# Patient Record
Sex: FEMALE | Race: WHITE | HISPANIC OR LATINO
[De-identification: names, ages, dates, MRNs, and addresses within clinical notes are randomized per-mention and may not be internally consistent; named-entity substitution may affect disease eponyms.]

---

## 2018-01-24 PROBLEM — Z00.00 ENCOUNTER FOR PREVENTIVE HEALTH EXAMINATION: Status: ACTIVE | Noted: 2018-01-24

## 2018-02-06 ENCOUNTER — CHART COPY (OUTPATIENT)
Age: 33
End: 2018-02-06

## 2018-02-07 ENCOUNTER — APPOINTMENT (OUTPATIENT)
Dept: OBGYN | Facility: CLINIC | Age: 33
End: 2018-02-07

## 2018-02-07 ENCOUNTER — RECORD ABSTRACTING (OUTPATIENT)
Age: 33
End: 2018-02-07

## 2018-02-07 DIAGNOSIS — R87.629 UNSPECIFIED ABNORMAL CYTOLOGICAL FINDINGS IN SPECIMENS FROM VAGINA: ICD-10-CM

## 2018-02-07 DIAGNOSIS — Z87.42 PERSONAL HISTORY OF OTHER DISEASES OF THE FEMALE GENITAL TRACT: ICD-10-CM

## 2018-02-07 DIAGNOSIS — Z83.3 FAMILY HISTORY OF DIABETES MELLITUS: ICD-10-CM

## 2018-02-07 DIAGNOSIS — Z82.0 FAMILY HISTORY OF EPILEPSY AND OTHER DISEASES OF THE NERVOUS SYSTEM: ICD-10-CM

## 2018-02-07 DIAGNOSIS — Z87.891 PERSONAL HISTORY OF NICOTINE DEPENDENCE: ICD-10-CM

## 2018-02-07 DIAGNOSIS — B37.9 CANDIDIASIS, UNSPECIFIED: ICD-10-CM

## 2018-02-07 DIAGNOSIS — Z86.32 PERSONAL HISTORY OF GESTATIONAL DIABETES: ICD-10-CM

## 2018-02-08 ENCOUNTER — OUTPATIENT (OUTPATIENT)
Dept: OUTPATIENT SERVICES | Facility: HOSPITAL | Age: 33
LOS: 1 days | Discharge: HOME | End: 2018-02-08

## 2018-02-08 ENCOUNTER — APPOINTMENT (OUTPATIENT)
Dept: OBGYN | Facility: CLINIC | Age: 33
End: 2018-02-08
Payer: COMMERCIAL

## 2018-02-08 ENCOUNTER — LABORATORY RESULT (OUTPATIENT)
Age: 33
End: 2018-02-08

## 2018-02-08 VITALS — WEIGHT: 255 LBS | BODY MASS INDEX: 36.92 KG/M2 | HEIGHT: 69.5 IN

## 2018-02-08 LAB
BILIRUB UR QL STRIP: NORMAL
GLUCOSE UR-MCNC: >1000
HGB UR QL STRIP.AUTO: NORMAL
KETONES UR-MCNC: NORMAL
LEUKOCYTE ESTERASE UR QL STRIP: NORMAL
NITRITE UR QL STRIP: NORMAL
PH UR STRIP: 7
PROT UR STRIP-MCNC: NORMAL
SP GR UR STRIP: 1.01

## 2018-02-08 PROCEDURE — 76817 TRANSVAGINAL US OBSTETRIC: CPT

## 2018-02-08 PROCEDURE — 81025 URINE PREGNANCY TEST: CPT

## 2018-02-08 PROCEDURE — 81003 URINALYSIS AUTO W/O SCOPE: CPT | Mod: QW

## 2018-02-08 PROCEDURE — 99395 PREV VISIT EST AGE 18-39: CPT | Mod: 25

## 2018-02-09 ENCOUNTER — RX RENEWAL (OUTPATIENT)
Age: 33
End: 2018-02-09

## 2018-02-15 DIAGNOSIS — N91.2 AMENORRHEA, UNSPECIFIED: ICD-10-CM

## 2018-02-16 ENCOUNTER — CHART COPY (OUTPATIENT)
Age: 33
End: 2018-02-16

## 2018-02-22 ENCOUNTER — APPOINTMENT (OUTPATIENT)
Dept: OBGYN | Facility: CLINIC | Age: 33
End: 2018-02-22
Payer: COMMERCIAL

## 2018-02-22 PROCEDURE — 76830 TRANSVAGINAL US NON-OB: CPT

## 2018-02-22 PROCEDURE — 99213 OFFICE O/P EST LOW 20 MIN: CPT | Mod: 25

## 2019-09-30 ENCOUNTER — OTHER (OUTPATIENT)
Age: 34
End: 2019-09-30

## 2019-10-02 ENCOUNTER — OUTPATIENT (OUTPATIENT)
Dept: OUTPATIENT SERVICES | Facility: HOSPITAL | Age: 34
LOS: 1 days | Discharge: HOME | End: 2019-10-02

## 2019-10-02 ENCOUNTER — LABORATORY RESULT (OUTPATIENT)
Age: 34
End: 2019-10-02

## 2019-10-02 ENCOUNTER — APPOINTMENT (OUTPATIENT)
Dept: OBGYN | Facility: CLINIC | Age: 34
End: 2019-10-02
Payer: COMMERCIAL

## 2019-10-02 VITALS — BODY MASS INDEX: 37.47 KG/M2 | HEIGHT: 69 IN | WEIGHT: 253 LBS

## 2019-10-02 PROCEDURE — 76830 TRANSVAGINAL US NON-OB: CPT

## 2019-10-02 PROCEDURE — 81025 URINE PREGNANCY TEST: CPT

## 2019-10-02 PROCEDURE — 99395 PREV VISIT EST AGE 18-39: CPT | Mod: 25

## 2019-10-02 PROCEDURE — 81003 URINALYSIS AUTO W/O SCOPE: CPT | Mod: QW

## 2019-10-03 DIAGNOSIS — Z01.419 ENCOUNTER FOR GYNECOLOGICAL EXAMINATION (GENERAL) (ROUTINE) WITHOUT ABNORMAL FINDINGS: ICD-10-CM

## 2019-10-29 ENCOUNTER — APPOINTMENT (OUTPATIENT)
Dept: OBGYN | Facility: CLINIC | Age: 34
End: 2019-10-29
Payer: COMMERCIAL

## 2019-10-29 ENCOUNTER — NON-APPOINTMENT (OUTPATIENT)
Age: 34
End: 2019-10-29

## 2019-10-29 VITALS — WEIGHT: 257 LBS | HEIGHT: 69 IN | BODY MASS INDEX: 38.06 KG/M2

## 2019-10-29 PROCEDURE — 0502F SUBSEQUENT PRENATAL CARE: CPT

## 2019-11-01 LAB
BILIRUB UR QL STRIP: NORMAL
BILIRUB UR QL STRIP: NORMAL
CLARITY UR: CLEAR
CLARITY UR: CLEAR
GLUCOSE UR-MCNC: NORMAL
GLUCOSE UR-MCNC: NORMAL
HCG UR QL: NORMAL EU/DL
HCG UR QL: NORMAL EU/DL
HGB UR QL STRIP.AUTO: NORMAL
HGB UR QL STRIP.AUTO: NORMAL
KETONES UR-MCNC: NORMAL
KETONES UR-MCNC: NORMAL
LEUKOCYTE ESTERASE UR QL STRIP: 500
LEUKOCYTE ESTERASE UR QL STRIP: NORMAL
NITRITE UR QL STRIP: NORMAL
NITRITE UR QL STRIP: NORMAL
PH UR STRIP: 5
PH UR STRIP: 5
PROT UR STRIP-MCNC: NORMAL
PROT UR STRIP-MCNC: NORMAL
SP GR UR STRIP: 1.02
SP GR UR STRIP: 1.02

## 2019-11-18 ENCOUNTER — NON-APPOINTMENT (OUTPATIENT)
Age: 34
End: 2019-11-18

## 2019-11-21 ENCOUNTER — NON-APPOINTMENT (OUTPATIENT)
Age: 34
End: 2019-11-21

## 2019-11-27 ENCOUNTER — NON-APPOINTMENT (OUTPATIENT)
Age: 34
End: 2019-11-27

## 2019-11-27 ENCOUNTER — APPOINTMENT (OUTPATIENT)
Dept: OBGYN | Facility: CLINIC | Age: 34
End: 2019-11-27
Payer: COMMERCIAL

## 2019-11-27 VITALS — SYSTOLIC BLOOD PRESSURE: 140 MMHG | WEIGHT: 252 LBS | DIASTOLIC BLOOD PRESSURE: 80 MMHG | BODY MASS INDEX: 37.21 KG/M2

## 2019-11-27 LAB
GLUCOSE UR-MCNC: NORMAL
HGB UR QL STRIP.AUTO: NORMAL
KETONES UR-MCNC: NORMAL
LEUKOCYTE ESTERASE UR QL STRIP: NORMAL
NITRITE UR QL STRIP: NORMAL
PROT UR STRIP-MCNC: NORMAL

## 2019-11-27 PROCEDURE — 0502F SUBSEQUENT PRENATAL CARE: CPT

## 2019-12-19 ENCOUNTER — APPOINTMENT (OUTPATIENT)
Dept: OBGYN | Facility: CLINIC | Age: 34
End: 2019-12-19

## 2019-12-19 ENCOUNTER — NON-APPOINTMENT (OUTPATIENT)
Age: 34
End: 2019-12-19

## 2019-12-30 ENCOUNTER — APPOINTMENT (OUTPATIENT)
Dept: OBGYN | Facility: CLINIC | Age: 34
End: 2019-12-30
Payer: COMMERCIAL

## 2019-12-30 ENCOUNTER — NON-APPOINTMENT (OUTPATIENT)
Age: 34
End: 2019-12-30

## 2019-12-30 VITALS — WEIGHT: 259 LBS | BODY MASS INDEX: 38.36 KG/M2 | HEIGHT: 69 IN

## 2019-12-30 PROCEDURE — 0502F SUBSEQUENT PRENATAL CARE: CPT

## 2020-01-01 LAB
BILIRUB UR QL STRIP: NORMAL
CLARITY UR: CLEAR
GLUCOSE UR-MCNC: 50
HCG UR QL: NORMAL EU/DL
HGB UR QL STRIP.AUTO: NORMAL
KETONES UR-MCNC: NORMAL
LEUKOCYTE ESTERASE UR QL STRIP: NORMAL
NITRITE UR QL STRIP: NORMAL
PH UR STRIP: 6
PROT UR STRIP-MCNC: NORMAL
SP GR UR STRIP: 1.01

## 2020-01-15 ENCOUNTER — APPOINTMENT (OUTPATIENT)
Dept: OBGYN | Facility: CLINIC | Age: 35
End: 2020-01-15
Payer: COMMERCIAL

## 2020-01-15 ENCOUNTER — NON-APPOINTMENT (OUTPATIENT)
Age: 35
End: 2020-01-15

## 2020-01-15 VITALS — WEIGHT: 261 LBS | DIASTOLIC BLOOD PRESSURE: 80 MMHG | BODY MASS INDEX: 38.54 KG/M2 | SYSTOLIC BLOOD PRESSURE: 120 MMHG

## 2020-01-15 PROCEDURE — 0502F SUBSEQUENT PRENATAL CARE: CPT

## 2020-01-27 ENCOUNTER — NON-APPOINTMENT (OUTPATIENT)
Age: 35
End: 2020-01-27

## 2020-01-31 ENCOUNTER — OUTPATIENT (OUTPATIENT)
Dept: OUTPATIENT SERVICES | Facility: HOSPITAL | Age: 35
LOS: 1 days | Discharge: HOME | End: 2020-01-31

## 2020-01-31 ENCOUNTER — APPOINTMENT (OUTPATIENT)
Dept: ANTEPARTUM | Facility: CLINIC | Age: 35
End: 2020-01-31
Payer: COMMERCIAL

## 2020-01-31 ENCOUNTER — RESULT CHARGE (OUTPATIENT)
Age: 35
End: 2020-01-31

## 2020-01-31 VITALS
WEIGHT: 262 LBS | SYSTOLIC BLOOD PRESSURE: 124 MMHG | BODY MASS INDEX: 38.69 KG/M2 | OXYGEN SATURATION: 97 % | HEART RATE: 86 BPM | TEMPERATURE: 97.7 F | DIASTOLIC BLOOD PRESSURE: 86 MMHG

## 2020-01-31 DIAGNOSIS — Z34.90 ENCOUNTER FOR SUPERVISION OF NORMAL PREGNANCY, UNSPECIFIED, UNSPECIFIED TRIMESTER: ICD-10-CM

## 2020-01-31 LAB
BILIRUB UR QL STRIP: NORMAL
CLARITY UR: CLEAR
COLLECTION METHOD: NORMAL
FETAL HEART DESCRIPTION: NORMAL
FETAL HEART RATE (BPM): 147
FETAL MOVEMENT: PRESENT
GLUCOSE BLDC GLUCOMTR-MCNC: 146
GLUCOSE BLDC GLUCOMTR-MCNC: 146 MG/DL — HIGH (ref 70–99)
GLUCOSE UR-MCNC: NORMAL
HCG UR QL: 0.2 EU/DL
HGB UR QL STRIP.AUTO: NORMAL
KETONES UR-MCNC: NORMAL
LEUKOCYTE ESTERASE UR QL STRIP: NORMAL
NITRITE UR QL STRIP: NORMAL
OB COMMENTS: NORMAL
PH UR STRIP: 5
PROT UR STRIP-MCNC: 3
SCHEDULED VISIT: YES
SP GR UR STRIP: 1.01
URINE ALBUMIN/PROTEIN: 3
URINE GLUCOSE: NORMAL
URINE KETONES: NORMAL
WEEKS GESTATION: 25

## 2020-01-31 PROCEDURE — 99215 OFFICE O/P EST HI 40 MIN: CPT

## 2020-01-31 RX ORDER — METHYLDOPA 250 MG/1
250 TABLET, FILM COATED ORAL TWICE DAILY
Qty: 90 | Refills: 2 | Status: DISCONTINUED | COMMUNITY
Start: 2018-02-08 | End: 2020-01-31

## 2020-01-31 RX ORDER — INSULIN LISPRO 100 [IU]/ML
100 INJECTION, SOLUTION INTRAVENOUS; SUBCUTANEOUS
Qty: 5 | Refills: 0 | Status: ACTIVE | COMMUNITY
Start: 2020-01-31 | End: 1900-01-01

## 2020-01-31 RX ORDER — ATENOLOL 50 MG/1
50 TABLET ORAL
Refills: 0 | Status: DISCONTINUED | COMMUNITY
End: 2020-01-31

## 2020-01-31 RX ORDER — NITROFURANTOIN (MONOHYDRATE/MACROCRYSTALS) 25; 75 MG/1; MG/1
100 CAPSULE ORAL TWICE DAILY
Qty: 14 | Refills: 0 | Status: DISCONTINUED | COMMUNITY
Start: 2018-02-09 | End: 2020-01-31

## 2020-01-31 RX ORDER — PROGESTERONE 200 MG/1
200 CAPSULE ORAL
Qty: 30 | Refills: 2 | Status: DISCONTINUED | COMMUNITY
Start: 2019-10-29 | End: 2020-01-31

## 2020-01-31 RX ORDER — INSULIN LISPRO 100 [IU]/ML
100 INJECTION, SOLUTION INTRAVENOUS; SUBCUTANEOUS
Qty: 5 | Refills: 0 | Status: ACTIVE | COMMUNITY
Start: 2020-01-31

## 2020-01-31 NOTE — DISCUSSION/SUMMARY
[FreeTextEntry1] : Fasting blood sugar 146 today. \par Discussed optimal diabetes control in pregnancy. Advised patient to contact her endocrinologist weekly with her blood sugar logs because she needs more insulin.

## 2020-01-31 NOTE — OB HISTORY
[Pregnancy History] : boy [___] : pregnancy complications occured [LMP: ___] : LMP: [unfilled] [SAGE: ___] : SAGE: [unfilled] [EGA: ___ wks] : EGA: [unfilled] wks [Spontaneous] : Spontaneous conception [Sonogram] : sonogram [at ___ wks] : at [unfilled] weeks [de-identified] : early ultrasound [FreeTextEntry1] : Stopped her diabetic medications when she found out she was pregnant. A1c was 9 at the time. Started on Levimir and humalog by an endocrinologist in [place]. Her A1c is down to 6.2. FBS range 90 - 140's, Post prandials are 120 - 200. She adds extra short acting insulin when > 160. BP has been good on labetalol 100 mg twice a day. Had a subchorionic collection at 12 weeks that has resolved.

## 2020-01-31 NOTE — ACTIVE PROBLEMS
[Heart Disease] : no heart disease [Autoimmune Disease] : no autoimmune disease [Renal Disease] : no kidney disease, no UTI [Neurologic Disorder] : no neurologic disorder, no epilepsy [Psychiatric Disorders] : no psychiatric disorders [Depression] : no depression, no post partum depression [Hepatic Disorder] : no hepatitis, no liver disease [Thrombophlebitis] : no varicosities, no phlebitis [Thyroid Disorder] : no thyroid dysfunction [Trauma] : no trauma/violence [Blood Transfusion (___ Ml)] : no history of blood transfusion

## 2020-01-31 NOTE — CHIEF COMPLAINT
[G ___] : G [unfilled] [P ___] : P [unfilled] [Chronic Hypertension] : Chronic Hypertension [Pre-existing Diabetes] : Pre-existing Diabetes [H/O  Delivery] : history of  delivery

## 2020-02-03 DIAGNOSIS — E66.9 OBESITY, UNSPECIFIED: ICD-10-CM

## 2020-02-03 DIAGNOSIS — O09.90 SUPERVISION OF HIGH RISK PREGNANCY, UNSPECIFIED, UNSPECIFIED TRIMESTER: ICD-10-CM

## 2020-02-03 DIAGNOSIS — I10 ESSENTIAL (PRIMARY) HYPERTENSION: ICD-10-CM

## 2020-02-03 DIAGNOSIS — E11.9 TYPE 2 DIABETES MELLITUS WITHOUT COMPLICATIONS: ICD-10-CM

## 2020-02-03 DIAGNOSIS — Z3A.25 25 WEEKS GESTATION OF PREGNANCY: ICD-10-CM

## 2020-02-07 ENCOUNTER — NON-APPOINTMENT (OUTPATIENT)
Age: 35
End: 2020-02-07

## 2020-02-07 ENCOUNTER — APPOINTMENT (OUTPATIENT)
Dept: OBGYN | Facility: CLINIC | Age: 35
End: 2020-02-07
Payer: COMMERCIAL

## 2020-02-07 VITALS — WEIGHT: 258 LBS | BODY MASS INDEX: 38.1 KG/M2 | SYSTOLIC BLOOD PRESSURE: 110 MMHG | DIASTOLIC BLOOD PRESSURE: 80 MMHG

## 2020-02-07 PROCEDURE — 0502F SUBSEQUENT PRENATAL CARE: CPT

## 2020-02-21 ENCOUNTER — APPOINTMENT (OUTPATIENT)
Dept: ANTEPARTUM | Facility: CLINIC | Age: 35
End: 2020-02-21
Payer: COMMERCIAL

## 2020-02-21 ENCOUNTER — OUTPATIENT (OUTPATIENT)
Dept: OUTPATIENT SERVICES | Facility: HOSPITAL | Age: 35
LOS: 1 days | Discharge: HOME | End: 2020-02-21

## 2020-02-21 ENCOUNTER — ASOB RESULT (OUTPATIENT)
Age: 35
End: 2020-02-21

## 2020-02-21 VITALS
BODY MASS INDEX: 38.69 KG/M2 | WEIGHT: 262 LBS | SYSTOLIC BLOOD PRESSURE: 124 MMHG | OXYGEN SATURATION: 99 % | TEMPERATURE: 97.9 F | HEART RATE: 85 BPM | DIASTOLIC BLOOD PRESSURE: 72 MMHG

## 2020-02-21 LAB
BILIRUB UR QL STRIP: NEGATIVE
CLARITY UR: CLEAR
COLLECTION METHOD: NORMAL
FETAL HEART DESCRIPTION: NORMAL
FETAL HEART RATE (BPM): 140
FETAL MOVEMENT: PRESENT
GLUCOSE BLDC GLUCOMTR-MCNC: 112
GLUCOSE UR-MCNC: NEGATIVE
HCG UR QL: 0.2 EU/DL
HGB UR QL STRIP.AUTO: NEGATIVE
KETONES UR-MCNC: NEGATIVE
LEUKOCYTE ESTERASE UR QL STRIP: NEGATIVE
NITRITE UR QL STRIP: NEGATIVE
OB COMMENTS: NORMAL
PH UR STRIP: 6.5
PROT UR STRIP-MCNC: NORMAL
SCHEDULED VISIT: YES
SP GR UR STRIP: 1.02
URINE ALBUMIN/PROTEIN: NORMAL
URINE GLUCOSE: NEGATIVE
URINE KETONES: NEGATIVE
WEEKS GESTATION: 28.1

## 2020-02-21 PROCEDURE — 76816 OB US FOLLOW-UP PER FETUS: CPT | Mod: 26

## 2020-02-21 PROCEDURE — 99214 OFFICE O/P EST MOD 30 MIN: CPT | Mod: 25

## 2020-02-21 PROCEDURE — 76819 FETAL BIOPHYS PROFIL W/O NST: CPT | Mod: 26

## 2020-02-21 RX ORDER — INSULIN DETEMIR 100 [IU]/ML
100 INJECTION, SOLUTION SUBCUTANEOUS
Qty: 1 | Refills: 0 | Status: ACTIVE | COMMUNITY
Start: 2020-01-31

## 2020-02-21 RX ORDER — LABETALOL HYDROCHLORIDE 200 MG/1
200 TABLET, FILM COATED ORAL
Refills: 0 | Status: ACTIVE | COMMUNITY
Start: 2020-01-31

## 2020-02-21 NOTE — ACTIVE PROBLEMS
[Heart Disease] : no heart disease [Autoimmune Disease] : no autoimmune disease [Renal Disease] : no kidney disease, no UTI [Psychiatric Disorders] : no psychiatric disorders [Neurologic Disorder] : no neurologic disorder, no epilepsy [Depression] : no depression, no post partum depression [Hepatic Disorder] : no hepatitis, no liver disease [Thrombophlebitis] : no varicosities, no phlebitis [Thyroid Disorder] : no thyroid dysfunction [Trauma] : no trauma/violence [Blood Transfusion (___ Ml)] : no history of blood transfusion

## 2020-02-21 NOTE — OB HISTORY
[Pregnancy History] : boy [___] : Delivery occurred at [unfilled] [LMP: ___] : LMP: [unfilled] [SAGE: ___] : SAGE: [unfilled] [EGA: ___ wks] : EGA: [unfilled] wks [Spontaneous] : Spontaneous conception [Sonogram] : sonogram [at ___ wks] : at [unfilled] weeks [FreeTextEntry1] : Stopped her diabetic medications when she found out she was pregnant. A1c was 9 at the time. Started on Levimir and humalog by an endocrinologist in [place]. Her A1c is down to 6.2. FBS range 90 - 140's, Post prandials are 120 - 200. She adds extra short acting insulin when > 160. BP has been good on labetalol 100 mg twice a day. Had a subchorionic collection at 12 weeks that has resolved. [de-identified] : early ultrasound

## 2020-02-21 NOTE — END OF VISIT
[] : Resident [FreeTextEntry3] : Mother and fetus doing well. Glucose control is less than ideal. Fetus is not too large. Discussed need for more insulin. Asked her to begin recording total amount of insulin she is using daily. Has endocrine appointment next week.

## 2020-02-21 NOTE — CHIEF COMPLAINT
[P ___] : P [unfilled] [G ___] : G [unfilled] [Chronic Hypertension] : Chronic Hypertension [Pre-existing Diabetes] : Pre-existing Diabetes [H/O  Delivery] : history of  delivery

## 2020-02-21 NOTE — DISCUSSION/SUMMARY
[FreeTextEntry1] : 35 yo  at 28w1d, co-managed with [facility] for T2DM, CHTN and hx of  delivery. Good FM, no LOF, VB or contractions.\par Denies headaches, vision changes, CP, SOB, RUQ/epigastric pain  or increased swelling.\par \par #T2DM on glucose monitor\par -FS today 112 (has not eaten in since lunch yesterday)\par -REGIMEN: Levemir 20u in AM, 8u at night, Humalog 8-20u prior to meals (does not carb count, just on average boluses herself how much she things), she then checks her FS 1 hr PP, if >140, gives herself 1u per every 40u above 140. Discussed need for increased insulin. Pt to record total amount of insulin given a day, and increased averaged insuling per meal to 10-24. Should discuss this with her endocrinologist first. \par -FS log: fasting , postprandial , 2/3s of postprandial values elevated, all fasting values elevated. \par Discussed FS goals Fasting <95, PP<120\par -Had 4 hypoglycemia episodes bw 40-60 in early AM, never symptomatic\par -Hypoglycemia precautions given\par -Has endocrinology appointment next week, will check HgA1c at that time and adjust regimen.\par -Completed fetal echo, normal per pt (Dr. Daniel).\par -Risks of uncontrolled T2DM previously discussed. \par \par #CHTN\par -BP today 124/72\par -Has not taken BP logs at home recently, reports usually 110s/80s\par -On Labetalol 200mg BID\par -Risks of CHTN in pregnancy discussed previously\par -Recommended BP log\par -Will go for preeclamptic labs and 24hr collection next week.\par \par #Hx  delivery\par -Could not tolerate oral progesterone in early pregnancy which was prescribed by another provider. Declined [place].\par - labor precautions given\par \par #Pregnancy\par -Continue care with Agustin Ellington/Wade, next appointment \par -Weekly BPP and PNOV\par -c/w aspirin\par PTL precautions and FKC discussed,encouraged PO hydration.

## 2020-02-23 ENCOUNTER — NON-APPOINTMENT (OUTPATIENT)
Age: 35
End: 2020-02-23

## 2020-02-25 DIAGNOSIS — E66.9 OBESITY, UNSPECIFIED: ICD-10-CM

## 2020-02-25 DIAGNOSIS — E11.9 TYPE 2 DIABETES MELLITUS WITHOUT COMPLICATIONS: ICD-10-CM

## 2020-02-25 DIAGNOSIS — Z3A.28 28 WEEKS GESTATION OF PREGNANCY: ICD-10-CM

## 2020-02-25 DIAGNOSIS — O09.529 SUPERVISION OF ELDERLY MULTIGRAVIDA, UNSPECIFIED TRIMESTER: ICD-10-CM

## 2020-02-25 DIAGNOSIS — O09.90 SUPERVISION OF HIGH RISK PREGNANCY, UNSPECIFIED, UNSPECIFIED TRIMESTER: ICD-10-CM

## 2020-02-25 DIAGNOSIS — I10 ESSENTIAL (PRIMARY) HYPERTENSION: ICD-10-CM

## 2020-02-28 ENCOUNTER — RESULT CHARGE (OUTPATIENT)
Age: 35
End: 2020-02-28

## 2020-02-28 ENCOUNTER — APPOINTMENT (OUTPATIENT)
Dept: ANTEPARTUM | Facility: CLINIC | Age: 35
End: 2020-02-28
Payer: COMMERCIAL

## 2020-02-28 ENCOUNTER — OUTPATIENT (OUTPATIENT)
Dept: OUTPATIENT SERVICES | Facility: HOSPITAL | Age: 35
LOS: 1 days | Discharge: HOME | End: 2020-02-28

## 2020-02-28 ENCOUNTER — ASOB RESULT (OUTPATIENT)
Age: 35
End: 2020-02-28

## 2020-02-28 ENCOUNTER — NON-APPOINTMENT (OUTPATIENT)
Age: 35
End: 2020-02-28

## 2020-02-28 VITALS
TEMPERATURE: 97.9 F | SYSTOLIC BLOOD PRESSURE: 133 MMHG | OXYGEN SATURATION: 98 % | BODY MASS INDEX: 38.84 KG/M2 | DIASTOLIC BLOOD PRESSURE: 72 MMHG | HEART RATE: 90 BPM | WEIGHT: 263 LBS

## 2020-02-28 LAB
BILIRUB UR QL STRIP: NORMAL
CLARITY UR: CLEAR
COLLECTION METHOD: NORMAL
FETAL HEART DESCRIPTION: NORMAL
FETAL HEART RATE (BPM): NORMAL
FETAL MOVEMENT: PRESENT
GLUCOSE UR-MCNC: NORMAL
HCG UR QL: 0.2 EU/DL
HGB UR QL STRIP.AUTO: NORMAL
KETONES UR-MCNC: NORMAL
LEUKOCYTE ESTERASE UR QL STRIP: NORMAL
NITRITE UR QL STRIP: NORMAL
OB COMMENTS: NORMAL
PH UR STRIP: 6
PROT UR STRIP-MCNC: NORMAL
SCHEDULED VISIT: YES
SP GR UR STRIP: 1.02
URINE ALBUMIN/PROTEIN: NORMAL
URINE GLUCOSE: NORMAL
URINE KETONES: NORMAL
WEEKS GESTATION: 29.1

## 2020-02-28 PROCEDURE — 76819 FETAL BIOPHYS PROFIL W/O NST: CPT | Mod: 26

## 2020-02-28 PROCEDURE — 99214 OFFICE O/P EST MOD 30 MIN: CPT | Mod: 25

## 2020-03-04 DIAGNOSIS — O10.919 UNSPECIFIED PRE-EXISTING HYPERTENSION COMPLICATING PREGNANCY, UNSPECIFIED TRIMESTER: ICD-10-CM

## 2020-03-04 DIAGNOSIS — O24.313 UNSPECIFIED PRE-EXISTING DIABETES MELLITUS IN PREGNANCY, THIRD TRIMESTER: ICD-10-CM

## 2020-03-04 DIAGNOSIS — Z3A.29 29 WEEKS GESTATION OF PREGNANCY: ICD-10-CM

## 2020-03-04 DIAGNOSIS — O09.523 SUPERVISION OF ELDERLY MULTIGRAVIDA, THIRD TRIMESTER: ICD-10-CM

## 2020-03-04 DIAGNOSIS — O99.213 OBESITY COMPLICATING PREGNANCY, THIRD TRIMESTER: ICD-10-CM

## 2020-03-05 ENCOUNTER — APPOINTMENT (OUTPATIENT)
Dept: OBGYN | Facility: CLINIC | Age: 35
End: 2020-03-05
Payer: COMMERCIAL

## 2020-03-05 ENCOUNTER — NON-APPOINTMENT (OUTPATIENT)
Age: 35
End: 2020-03-05

## 2020-03-05 VITALS — BODY MASS INDEX: 37.51 KG/M2 | HEIGHT: 70 IN | WEIGHT: 262 LBS

## 2020-03-05 LAB
BILIRUB UR QL STRIP: 1
CLARITY UR: CLEAR
GLUCOSE UR-MCNC: NORMAL
HCG UR QL: 1 EU/DL
HGB UR QL STRIP.AUTO: NORMAL
KETONES UR-MCNC: 50
LEUKOCYTE ESTERASE UR QL STRIP: 25
NITRITE UR QL STRIP: NORMAL
PH UR STRIP: 6
PROT UR STRIP-MCNC: NORMAL
SP GR UR STRIP: 1.02

## 2020-03-05 PROCEDURE — 0502F SUBSEQUENT PRENATAL CARE: CPT

## 2020-03-06 ENCOUNTER — APPOINTMENT (OUTPATIENT)
Dept: ANTEPARTUM | Facility: CLINIC | Age: 35
End: 2020-03-06
Payer: COMMERCIAL

## 2020-03-06 ENCOUNTER — RESULT CHARGE (OUTPATIENT)
Age: 35
End: 2020-03-06

## 2020-03-06 ENCOUNTER — OUTPATIENT (OUTPATIENT)
Dept: OUTPATIENT SERVICES | Facility: HOSPITAL | Age: 35
LOS: 1 days | Discharge: HOME | End: 2020-03-06

## 2020-03-06 ENCOUNTER — ASOB RESULT (OUTPATIENT)
Age: 35
End: 2020-03-06

## 2020-03-06 VITALS
BODY MASS INDEX: 37.88 KG/M2 | DIASTOLIC BLOOD PRESSURE: 74 MMHG | TEMPERATURE: 97.7 F | OXYGEN SATURATION: 98 % | WEIGHT: 264 LBS | HEART RATE: 91 BPM | SYSTOLIC BLOOD PRESSURE: 123 MMHG

## 2020-03-06 DIAGNOSIS — J06.9 ACUTE UPPER RESPIRATORY INFECTION, UNSPECIFIED: ICD-10-CM

## 2020-03-06 LAB
BILIRUB UR QL STRIP: NEGATIVE
CLARITY UR: CLEAR
COLLECTION METHOD: NORMAL
FETAL HEART DESCRIPTION: NORMAL
FETAL HEART RATE (BPM): 150
FETAL MOVEMENT: PRESENT
GLUCOSE BLDC GLUCOMTR-MCNC: 143
GLUCOSE BLDC GLUCOMTR-MCNC: 143 MG/DL — HIGH (ref 70–99)
GLUCOSE UR-MCNC: NEGATIVE
HCG UR QL: 0.2 EU/DL
HGB UR QL STRIP.AUTO: NEGATIVE
KETONES UR-MCNC: NORMAL
LEUKOCYTE ESTERASE UR QL STRIP: NEGATIVE
NITRITE UR QL STRIP: NEGATIVE
OB COMMENTS: NORMAL
PH UR STRIP: 6.5
PROT UR STRIP-MCNC: NEGATIVE
SCHEDULED VISIT: YES
SP GR UR STRIP: 1.02
URINE ALBUMIN/PROTEIN: NEGATIVE
URINE GLUCOSE: NEGATIVE
URINE KETONES: NORMAL
WEEKS GESTATION: 30.1

## 2020-03-06 PROCEDURE — 99214 OFFICE O/P EST MOD 30 MIN: CPT | Mod: 25

## 2020-03-06 PROCEDURE — 76819 FETAL BIOPHYS PROFIL W/O NST: CPT | Mod: 26

## 2020-03-06 NOTE — DISCUSSION/SUMMARY
[FreeTextEntry1] : 33 yo  at 30w1d, co-managed with [facility] for T2DM, CHTN and hx of  delivery. Good FM, no LOF, VB or contractions.\par Denies headaches, vision changes, CP, SOB, RUQ/epigastric pain  or increased swelling. Has an upper respiratory infection for the last week. Seen in urgent care. Flu negative. No fever. Continues to feel weak with a non productive cough.\par \par #T2DM on glucose monitor\par -FS today 143 (fasting)\par -REGIMEN: Levemir 25u in AM, 8u at night, Humalog 10-20u prior to meals (does not carb count, just on average boluses herself how much she things), she then checks her FS 1 hr PP, if >140, gives herself 1u per every 40u above 140. \par -FS log: fasting , postprandial , overall, all fasting values elevated and postprandial values improved. Also reports only 1 hypoglycemic values in the last week, asymptomatic. \par Discussed FS goals Fasting <95, PP<120\par -Hypoglycemia precautions given\par -Has endocrinology appointment next week, will check HgA1c at that time and adjust regimen.\par -Completed fetal echo, normal per pt (Dr. Daniel).\par -Risks of uncontrolled T2DM previously discussed. \par \par #CHTN\par -BP today 123/74\par -Still has not taken BP logs at home recently, reports usually 110s/80s. Again counseled on taking  BPd at home\par -Preeclmaptic precautions given\par -On Labetalol 200mg BID\par -Risks of CHTN in pregnancy discussed previously\par -Recommended BP log\par -Recommend  preeclamptic labs and 24hr collection (pt has not done this yet)\par \par #Hx  delivery\par -Could not tolerate oral progesterone in early pregnancy which was prescribed by another provider. Declined [place].\par - labor precautions given\par \par #Pregnancy\par -Continue care with Agustin Ellington/Wade, next appointment 3/2\par -Weekly BPP and PNOV\par -c/w aspirin\par PTL precautions and FKC discussed,encouraged PO hydration. Preeclamptic precautions given.\par RTC in 1 week

## 2020-03-06 NOTE — END OF VISIT
[] : Resident [FreeTextEntry3] : Blood sugar fair control. Will not change insulin at present due to URI.

## 2020-03-06 NOTE — PHYSICAL EXAM
[FreeTextEntry1] : General: In no apparent distress.\par HEENT:  Atraumatic.  Extraocular muscles intact.  Most mucous membranes.\par Neck:  No goiter.  No lymphadenopathy.\par Cardiovascular: Regular rate and rhythm, normal S1/S2\par Pulmonary: Clear to auscultation bilaterally.  No wheezing.\par Abdomen: soft, gravid, nontender, nondistended, no rebound, no guarding\par Extremities: no calf tenderness or edema\par Neurology: +2 reflexes in bilateral upper extremities\par Psychiatry:  Happy mood.  Awake , alert, oriented to person, place, time.\par

## 2020-03-09 ENCOUNTER — NON-APPOINTMENT (OUTPATIENT)
Age: 35
End: 2020-03-09

## 2020-03-09 DIAGNOSIS — Z3A.30 30 WEEKS GESTATION OF PREGNANCY: ICD-10-CM

## 2020-03-09 DIAGNOSIS — J06.9 ACUTE UPPER RESPIRATORY INFECTION, UNSPECIFIED: ICD-10-CM

## 2020-03-09 DIAGNOSIS — E66.9 OBESITY, UNSPECIFIED: ICD-10-CM

## 2020-03-09 DIAGNOSIS — I10 ESSENTIAL (PRIMARY) HYPERTENSION: ICD-10-CM

## 2020-03-09 DIAGNOSIS — E11.9 TYPE 2 DIABETES MELLITUS WITHOUT COMPLICATIONS: ICD-10-CM

## 2020-03-09 DIAGNOSIS — O09.90 SUPERVISION OF HIGH RISK PREGNANCY, UNSPECIFIED, UNSPECIFIED TRIMESTER: ICD-10-CM

## 2020-03-09 DIAGNOSIS — O09.529 SUPERVISION OF ELDERLY MULTIGRAVIDA, UNSPECIFIED TRIMESTER: ICD-10-CM

## 2020-03-13 ENCOUNTER — APPOINTMENT (OUTPATIENT)
Dept: ANTEPARTUM | Facility: CLINIC | Age: 35
End: 2020-03-13

## 2020-03-17 ENCOUNTER — APPOINTMENT (OUTPATIENT)
Dept: OBGYN | Facility: CLINIC | Age: 35
End: 2020-03-17
Payer: COMMERCIAL

## 2020-03-17 ENCOUNTER — NON-APPOINTMENT (OUTPATIENT)
Age: 35
End: 2020-03-17

## 2020-03-17 VITALS — BODY MASS INDEX: 37.02 KG/M2 | SYSTOLIC BLOOD PRESSURE: 130 MMHG | WEIGHT: 258 LBS | DIASTOLIC BLOOD PRESSURE: 80 MMHG

## 2020-03-17 PROCEDURE — 0502F SUBSEQUENT PRENATAL CARE: CPT

## 2020-03-20 ENCOUNTER — APPOINTMENT (OUTPATIENT)
Dept: ANTEPARTUM | Facility: CLINIC | Age: 35
End: 2020-03-20
Payer: COMMERCIAL

## 2020-03-20 ENCOUNTER — ASOB RESULT (OUTPATIENT)
Age: 35
End: 2020-03-20

## 2020-03-20 ENCOUNTER — OUTPATIENT (OUTPATIENT)
Dept: OUTPATIENT SERVICES | Facility: HOSPITAL | Age: 35
LOS: 1 days | Discharge: HOME | End: 2020-03-20

## 2020-03-20 ENCOUNTER — RESULT CHARGE (OUTPATIENT)
Age: 35
End: 2020-03-20

## 2020-03-20 VITALS
TEMPERATURE: 97.6 F | WEIGHT: 261 LBS | BODY MASS INDEX: 37.45 KG/M2 | HEART RATE: 86 BPM | DIASTOLIC BLOOD PRESSURE: 71 MMHG | SYSTOLIC BLOOD PRESSURE: 132 MMHG | OXYGEN SATURATION: 97 %

## 2020-03-20 LAB
BILIRUB UR QL STRIP: NORMAL
CLARITY UR: NORMAL
COLLECTION METHOD: NORMAL
FETAL HEART DESCRIPTION: NORMAL
FETAL HEART RATE (BPM): NORMAL
FETAL MOVEMENT: PRESENT
GLUCOSE BLDC GLUCOMTR-MCNC: 144
GLUCOSE BLDC GLUCOMTR-MCNC: 144 MG/DL — HIGH (ref 70–99)
GLUCOSE UR-MCNC: NORMAL
HCG UR QL: 0.2 EU/DL
HGB UR QL STRIP.AUTO: NORMAL
KETONES UR-MCNC: NORMAL
LEUKOCYTE ESTERASE UR QL STRIP: NORMAL
NITRITE UR QL STRIP: NORMAL
OB COMMENTS: NORMAL
PH UR STRIP: 5
PROT UR STRIP-MCNC: 1
SCHEDULED VISIT: YES
SP GR UR STRIP: 1.02
URINE ALBUMIN/PROTEIN: NORMAL
URINE GLUCOSE: NORMAL
URINE KETONES: NORMAL
WEEKS GESTATION: 32.1

## 2020-03-20 PROCEDURE — 99214 OFFICE O/P EST MOD 30 MIN: CPT | Mod: 25

## 2020-03-20 PROCEDURE — 76816 OB US FOLLOW-UP PER FETUS: CPT | Mod: 26

## 2020-03-20 PROCEDURE — 76818 FETAL BIOPHYS PROFILE W/NST: CPT | Mod: 26

## 2020-03-20 NOTE — DISCUSSION/SUMMARY
[FreeTextEntry1] : 35 yo  at 32w1d, co-managed with [facility] for T2DM, CHTN and hx of  delivery. Good FM, no LOF, VB or contractions.\par Denies headaches, vision changes, CP, SOB, RUQ/epigastric pain  or increased swelling. Resolved URI from 2 weeks ago, reports residual mild cough, but denies fevers, chills, or other URI sx. Stressed from current situation, not living at home due to renovations and could not live with family due to illness, now living in [employer] this week , has really not been able to focuse on meals and FS, and feels she may have forgotten to take insulin some days.\par \par #T2DM on glucose monitor\par -FS today 144 \par -REGIMEN: Levemir 28u in AM, 10u at night, Humalog 10-22u prior to meals (does not carb count, just on average boluses herself how much she things), she then checks her FS 1 hr PP, if >140, gives herself 1u per every 40u above 140. \par -Follows with endocrinologist, has next appt next week.\par -FS log: fasting , postprandial , overall fasting values worse than before but pt says she has not been focusing on meals or taking insulin consistently. Will increased Night time levemir to 12.\par Discussed FS goals Fasting <95, PP<120\par -Hypoglycemia precautions given\par -Has requisition for repeat HgA1C, has not gone yet\par -Completed fetal echo, normal per pt (Dr. Daniel).\par -Risks of uncontrolled T2DM previously discussed. \par \par #CHTN\par -BP today 123/74\par -Still has not taken BP logs at home recently, reports usually 110s/80s. Again counseled on taking  BPs at home\par -Preeclamptic precautions given\par -On Labetalol 200mg BID\par -Risks of CHTN in pregnancy discussed previously\par -Recommended BP log\par -Recommend  preeclamptic labs and 24hr collection (pt has not done this yet - afraid to go to labs) - will attempt to go  this week. Discussed importance of labwork.\par \par #Hx  delivery\par -Could not tolerate oral progesterone in early pregnancy which was prescribed by another provider. Declined [place].\par - labor precautions given\par \par #Pregnancy\par -Discussed general precautions including hand washing, avoiding touching face and avoiding sick people. If mild symptoms exist, encouraged to self isolate. If severe, present to the hospital,\par -Continue care with Agustin Ellington/Wade, next appointment next week\par -Will have weekly NSTs in their office, BPPs to be done in [facility]\par -Weekly BPP and PNOV\par -c/w aspirin\par PTL precautions and FKC discussed,encouraged PO hydration. Preeclamptic precautions given.\par RTC in 1 week

## 2020-03-20 NOTE — PHYSICAL EXAM
[FreeTextEntry1] : General: In no apparent distress.\par HEENT:  Atraumatic.  Extraocular muscles intact.  \par Cardiovascular: Regular rate and rhythm, normal S1/S2\par Pulmonary: Clear to auscultation bilaterally.  No wheezing.\par Abdomen: soft, gravid, nontender, nondistended, no rebound, no guarding\par Extremities: no calf tenderness or edema\par Neurology: +2 reflexes in bilateral upper extremities\par Psychiatry:  Anxious mood.  Awake , alert, oriented to person, place, time.\par

## 2020-03-20 NOTE — END OF VISIT
[FreeTextEntry3] : \par I saw and evaluated Ms. LEE with Dr. Valles and I agree with the documentation above.   I modified the note above (if indicated) and agree with its contents in the present form.\par \par DM2, on insulin.  Fasting fingersticks elevated. Postprandial values vary from 98- 170. She is not always taking her insulin.  This past week she moved to an air bed and breakfast to stay away from family members who work in the hospital, so she was only eating cereal many days. I will increase nighttime Levemir to 12 units.  Please followup with Endocrinologist.\par \par She has chronic hypertension and does not have a blood pressure log.  Blood pressure today was 132/71.\par \par She is very concerned about the Coronavirus and wants to stay away from the hospital as much as possible.\par \par Biophysical profile today was 8/10.  Nonreactive non stress test with moderate variability and no decelerations.  i recommended to follow up a repeat biophysical profile in four days to assess fetal well being at athat time but the patient declined.  She would like to follow up in a week.\par \par Fetal movement and labor precautions discussed.\par \par Prenatal care is with Dr. Olvera.\par \par RTC 1 week with blood pressure and log and fingerstick log.   I recommend delivery by 37 weeks gestation.  This may change depending on her glycemic control.  Discussed with Dr. Olvera by phone.\par \par Neymar Latham MD

## 2020-03-26 ENCOUNTER — APPOINTMENT (OUTPATIENT)
Dept: OBGYN | Facility: CLINIC | Age: 35
End: 2020-03-26

## 2020-03-26 DIAGNOSIS — O10.919 UNSPECIFIED PRE-EXISTING HYPERTENSION COMPLICATING PREGNANCY, UNSPECIFIED TRIMESTER: ICD-10-CM

## 2020-03-26 DIAGNOSIS — Z3A.32 32 WEEKS GESTATION OF PREGNANCY: ICD-10-CM

## 2020-03-26 DIAGNOSIS — O99.213 OBESITY COMPLICATING PREGNANCY, THIRD TRIMESTER: ICD-10-CM

## 2020-03-26 DIAGNOSIS — O24.313 UNSPECIFIED PRE-EXISTING DIABETES MELLITUS IN PREGNANCY, THIRD TRIMESTER: ICD-10-CM

## 2020-03-27 ENCOUNTER — RESULT CHARGE (OUTPATIENT)
Age: 35
End: 2020-03-27

## 2020-03-27 ENCOUNTER — OUTPATIENT (OUTPATIENT)
Dept: OUTPATIENT SERVICES | Facility: HOSPITAL | Age: 35
LOS: 1 days | Discharge: HOME | End: 2020-03-27

## 2020-03-27 ENCOUNTER — APPOINTMENT (OUTPATIENT)
Dept: ANTEPARTUM | Facility: CLINIC | Age: 35
End: 2020-03-27
Payer: COMMERCIAL

## 2020-03-27 ENCOUNTER — ASOB RESULT (OUTPATIENT)
Age: 35
End: 2020-03-27

## 2020-03-27 VITALS
DIASTOLIC BLOOD PRESSURE: 73 MMHG | WEIGHT: 261 LBS | OXYGEN SATURATION: 97 % | BODY MASS INDEX: 37.45 KG/M2 | TEMPERATURE: 97.7 F | SYSTOLIC BLOOD PRESSURE: 126 MMHG

## 2020-03-27 LAB
BILIRUB UR QL STRIP: NORMAL
CLARITY UR: CLEAR
COLLECTION METHOD: NORMAL
FETAL MOVEMENT: PRESENT
GLUCOSE BLDC GLUCOMTR-MCNC: 157
GLUCOSE BLDC GLUCOMTR-MCNC: 157 MG/DL — HIGH (ref 70–99)
GLUCOSE UR-MCNC: NORMAL
HCG UR QL: 0.2 EU/DL
HGB UR QL STRIP.AUTO: NORMAL
KETONES UR-MCNC: NORMAL
LEUKOCYTE ESTERASE UR QL STRIP: NORMAL
NITRITE UR QL STRIP: NORMAL
OB COMMENTS: NORMAL
PH UR STRIP: 6
PROT UR STRIP-MCNC: NORMAL
SCHEDULED VISIT: YES
SP GR UR STRIP: 1.02
URINE ALBUMIN/PROTEIN: NORMAL
URINE GLUCOSE: NORMAL
URINE KETONES: NORMAL
WEEKS GESTATION: 33.1

## 2020-03-27 PROCEDURE — 99214 OFFICE O/P EST MOD 30 MIN: CPT

## 2020-03-27 PROCEDURE — 76818 FETAL BIOPHYS PROFILE W/NST: CPT | Mod: 26

## 2020-03-27 PROCEDURE — 99214 OFFICE O/P EST MOD 30 MIN: CPT | Mod: 25

## 2020-03-27 NOTE — DISCUSSION/SUMMARY
[FreeTextEntry1] : 33 yo  at 33w1d, co-managed with [facility] for T2DM, CHTN and hx of  delivery. Good FM, no LOF, VB or contractions.\par Denies headache, blurry vision, chest pain, SOB, epigastric pain and leg swelling. Reports cough and sore throat resolved. No fever. Still living at [employer] but feels like she has settled. \par \par #T2DM on glucose monitor\par -FS today 157 thirty minutes postprandial\par -REGIMEN: Levemir 28u in AM, 12U at night, Humalog 10-22u prior to meals (does not carb count, just on average boluses herself how much she things), she then checks her FS 1 hr PP, if >140, gives herself 1u per every 40u above 140. \par - has not seen endocrinologist for past 2 monhts. Appointments are cancelled due to COVID. \par -FS log: fasting , postprandial .Insulin regimen changed to humalog 20-30 U prior to meals. \par - Discussed FS goals Fasting <95, PP<120\par -Hypoglycemia precautions given\par -Has requisition for repeat HgA1C, has not gone yet. Gave script again. \par -Completed fetal echo, normal per pt (Dr. Daniel). \par -Risks of uncontrolled T2DM previously discussed. \par \par #CHTN\par -BP today 126/73\par - reports BP cuff at home was inaccurate and lost it. Will give prescription to obtain new one\par -Preeclamptic precautions given\par -On Labetalol 200mg BID\par -Risks of CHTN in pregnancy discussed previously\par -Recommended BP log\par -Recommend  preeclamptic labs and 24hr collection (pt has not done this yet, will give script again)\par \par #Hx  delivery\par -Could not tolerate oral progesterone in early pregnancy which was prescribed by another provider. Declined [place].\par - labor precautions given\par \par #Pregnancy\par -Discussed general precautions including hand washing, avoiding touching face and avoiding sick people. If mild symptoms exist, encouraged to self isolate. If severe, present to the hospital,\par -Continue care with Agustin Ellington/Wade, next appointment next week\par -BPP today 10/10\par -c/w aspirin\par - recommend IOL at 37 weeks\par \par PTL precautions and FKC discussed, encouraged PO hydration. Preeclamptic precautions given.\par Weekly BPPs/NSTs\par RTC in 1 week

## 2020-03-27 NOTE — PHYSICAL EXAM
[FreeTextEntry1] : General: NAD, AAOX3\par Cardiovascular: Regular rate and rhythm, normal S1/S2\par Pulmonary: Clear to auscultation bilaterally.  No wheezing.\par Abdomen: soft, gravid, nontender, nondistended, no rebound, no guarding\par Extremities: no calf tenderness or edema\par Neurology: +2 reflexes in bilateral upper extremities\par \par

## 2020-03-30 ENCOUNTER — NON-APPOINTMENT (OUTPATIENT)
Age: 35
End: 2020-03-30

## 2020-03-30 ENCOUNTER — APPOINTMENT (OUTPATIENT)
Dept: OBGYN | Facility: CLINIC | Age: 35
End: 2020-03-30

## 2020-03-31 DIAGNOSIS — O09.90 SUPERVISION OF HIGH RISK PREGNANCY, UNSPECIFIED, UNSPECIFIED TRIMESTER: ICD-10-CM

## 2020-03-31 DIAGNOSIS — O09.529 SUPERVISION OF ELDERLY MULTIGRAVIDA, UNSPECIFIED TRIMESTER: ICD-10-CM

## 2020-03-31 DIAGNOSIS — I10 ESSENTIAL (PRIMARY) HYPERTENSION: ICD-10-CM

## 2020-03-31 DIAGNOSIS — Z3A.33 33 WEEKS GESTATION OF PREGNANCY: ICD-10-CM

## 2020-03-31 DIAGNOSIS — E11.9 TYPE 2 DIABETES MELLITUS WITHOUT COMPLICATIONS: ICD-10-CM

## 2020-04-02 ENCOUNTER — APPOINTMENT (OUTPATIENT)
Dept: OBGYN | Facility: CLINIC | Age: 35
End: 2020-04-02

## 2020-04-03 ENCOUNTER — RESULT CHARGE (OUTPATIENT)
Age: 35
End: 2020-04-03

## 2020-04-03 ENCOUNTER — ASOB RESULT (OUTPATIENT)
Age: 35
End: 2020-04-03

## 2020-04-03 ENCOUNTER — APPOINTMENT (OUTPATIENT)
Dept: ANTEPARTUM | Facility: CLINIC | Age: 35
End: 2020-04-03
Payer: COMMERCIAL

## 2020-04-03 ENCOUNTER — OUTPATIENT (OUTPATIENT)
Dept: OUTPATIENT SERVICES | Facility: HOSPITAL | Age: 35
LOS: 1 days | Discharge: HOME | End: 2020-04-03

## 2020-04-03 VITALS
WEIGHT: 261 LBS | OXYGEN SATURATION: 99 % | SYSTOLIC BLOOD PRESSURE: 115 MMHG | TEMPERATURE: 98.3 F | BODY MASS INDEX: 37.45 KG/M2 | HEART RATE: 92 BPM | DIASTOLIC BLOOD PRESSURE: 64 MMHG

## 2020-04-03 LAB
BILIRUB UR QL STRIP: NORMAL
CLARITY UR: CLEAR
COLLECTION METHOD: NORMAL
FETAL HEART DESCRIPTION: NORMAL
FETAL HEART RATE (BPM): NORMAL
FETAL MOVEMENT: PRESENT
GLUCOSE BLDC GLUCOMTR-MCNC: 134
GLUCOSE BLDC GLUCOMTR-MCNC: 134 MG/DL — HIGH (ref 70–99)
GLUCOSE UR-MCNC: NORMAL
HCG UR QL: 0.2 EU/DL
HGB UR QL STRIP.AUTO: NORMAL
KETONES UR-MCNC: NORMAL
LEUKOCYTE ESTERASE UR QL STRIP: NORMAL
NITRITE UR QL STRIP: NORMAL
OB COMMENTS: NORMAL
PH UR STRIP: 7
PROT UR STRIP-MCNC: 1
SCHEDULED VISIT: YES
SP GR UR STRIP: 1.02
URINE ALBUMIN/PROTEIN: 1
URINE GLUCOSE: NORMAL
URINE KETONES: NORMAL
WEEKS GESTATION: 34.1

## 2020-04-03 PROCEDURE — 76818 FETAL BIOPHYS PROFILE W/NST: CPT | Mod: 26

## 2020-04-03 PROCEDURE — 99214 OFFICE O/P EST MOD 30 MIN: CPT | Mod: 25

## 2020-04-07 ENCOUNTER — NON-APPOINTMENT (OUTPATIENT)
Age: 35
End: 2020-04-07

## 2020-04-07 DIAGNOSIS — I10 ESSENTIAL (PRIMARY) HYPERTENSION: ICD-10-CM

## 2020-04-07 DIAGNOSIS — E11.9 TYPE 2 DIABETES MELLITUS WITHOUT COMPLICATIONS: ICD-10-CM

## 2020-04-07 DIAGNOSIS — O09.90 SUPERVISION OF HIGH RISK PREGNANCY, UNSPECIFIED, UNSPECIFIED TRIMESTER: ICD-10-CM

## 2020-04-07 DIAGNOSIS — Z3A.34 34 WEEKS GESTATION OF PREGNANCY: ICD-10-CM

## 2020-04-07 DIAGNOSIS — O09.529 SUPERVISION OF ELDERLY MULTIGRAVIDA, UNSPECIFIED TRIMESTER: ICD-10-CM

## 2020-04-09 ENCOUNTER — APPOINTMENT (OUTPATIENT)
Dept: OBGYN | Facility: CLINIC | Age: 35
End: 2020-04-09

## 2020-04-10 ENCOUNTER — ASOB RESULT (OUTPATIENT)
Age: 35
End: 2020-04-10

## 2020-04-10 ENCOUNTER — APPOINTMENT (OUTPATIENT)
Dept: ANTEPARTUM | Facility: CLINIC | Age: 35
End: 2020-04-10
Payer: COMMERCIAL

## 2020-04-10 ENCOUNTER — RESULT CHARGE (OUTPATIENT)
Age: 35
End: 2020-04-10

## 2020-04-10 ENCOUNTER — OUTPATIENT (OUTPATIENT)
Dept: OUTPATIENT SERVICES | Facility: HOSPITAL | Age: 35
LOS: 1 days | Discharge: HOME | End: 2020-04-10

## 2020-04-10 VITALS
HEART RATE: 99 BPM | BODY MASS INDEX: 37.19 KG/M2 | DIASTOLIC BLOOD PRESSURE: 70 MMHG | OXYGEN SATURATION: 99 % | SYSTOLIC BLOOD PRESSURE: 118 MMHG | TEMPERATURE: 97.3 F | WEIGHT: 259.19 LBS

## 2020-04-10 DIAGNOSIS — Z01.419 ENCOUNTER FOR GYNECOLOGICAL EXAMINATION (GENERAL) (ROUTINE) W/OUT ABNORMAL FINDINGS: ICD-10-CM

## 2020-04-10 DIAGNOSIS — N39.0 URINARY TRACT INFECTION, SITE NOT SPECIFIED: ICD-10-CM

## 2020-04-10 DIAGNOSIS — Z87.59 PERSONAL HISTORY OF OTHER COMPLICATIONS OF PREGNANCY, CHILDBIRTH AND THE PUERPERIUM: ICD-10-CM

## 2020-04-10 DIAGNOSIS — Z87.42 PERSONAL HISTORY OF OTHER DISEASES OF THE FEMALE GENITAL TRACT: ICD-10-CM

## 2020-04-10 LAB
BILIRUB UR QL STRIP: NORMAL
CLARITY UR: CLEAR
COLLECTION METHOD: NORMAL
FETAL HEART DESCRIPTION: NORMAL
FETAL HEART RATE (BPM): NORMAL
FETAL MOVEMENT: PRESENT
GLUCOSE BLDC GLUCOMTR-MCNC: 141
GLUCOSE BLDC GLUCOMTR-MCNC: 141 MG/DL — HIGH (ref 70–99)
GLUCOSE UR-MCNC: NORMAL
HCG UR QL: 0.2 EU/DL
HGB UR QL STRIP.AUTO: NORMAL
KETONES UR-MCNC: NORMAL
LEUKOCYTE ESTERASE UR QL STRIP: NORMAL
NITRITE UR QL STRIP: NORMAL
OB COMMENTS: NORMAL
PH UR STRIP: 6.5
PROT UR STRIP-MCNC: 1
SCHEDULED VISIT: YES
SP GR UR STRIP: 1.01
URINE ALBUMIN/PROTEIN: 1
URINE GLUCOSE: NORMAL
URINE KETONES: NORMAL
WEEKS GESTATION: 35.1

## 2020-04-10 PROCEDURE — 76818 FETAL BIOPHYS PROFILE W/NST: CPT | Mod: 26

## 2020-04-10 PROCEDURE — 99214 OFFICE O/P EST MOD 30 MIN: CPT | Mod: 25

## 2020-04-10 NOTE — DISCUSSION/SUMMARY
[FreeTextEntry1] : 33 yo  at 35w1d, co-managed with [facility] for T2DM, CHTN and hx of  delivery. Good FM, no LOF, VB. Irregular contractions. Denies headache, blurry vision, chest pain, SOB, epigastric pain and leg swelling. \par \par #T2DM on glucose monitor\par -FS today 141 one hour postprandial\par -REGIMEN: Levemir 28u in AM, 12U at night, Humalog 20-30U prior to meals (does not carb count, just on average boluses herself how much she things), she then checks her FS 1 hr PP, if >140, gives herself 1u per every 40u above 140. \par - FS log reviewed, fair control. Fasting , two hours PP . More than 50% of postprandial values elevated.\par - New regimen: levemir 32U in AM and 12U in PM, humalog 20-30U before meals. \par - fetal echo received and normal\par - Hgb A1C done at [facility], will request results\par \par #CHTN\par - labetalol 200 mg BID\par -BP today 118/70\par -no BP log today, reports she forgot to measure them. Stressed importance again. \par - brought 24 hour protein collection to lab last week but sample was not accepted as patient collected it in a wrong way. Explained to patient again appropriate collection technique. \par -preeclamptic labs done at [facility], will request records. \par - daily ASA\par \par #Hx  delivery\par -Could not tolerate oral progesterone in early pregnancy which was prescribed by another provider. Declined [place].\par - labor precautions given\par \par #Pregnancy\par - weekly NST/BPPs\par - growth q month\par - recommend IOL at 37 weeks\par \par Prenatal care with Dr. Velez, next appointment. 20. \par Labor and fetal movement precautions given. \par RTC in 1 week.

## 2020-04-10 NOTE — END OF VISIT
[] : Resident [FreeTextEntry3] : More than 50 % of blood sugar values are above target. Increase morning  Levemir to 32 units. Mother and fetus are well.

## 2020-04-14 ENCOUNTER — LABORATORY RESULT (OUTPATIENT)
Age: 35
End: 2020-04-14

## 2020-04-14 ENCOUNTER — APPOINTMENT (OUTPATIENT)
Dept: OBGYN | Facility: CLINIC | Age: 35
End: 2020-04-14
Payer: COMMERCIAL

## 2020-04-14 ENCOUNTER — NON-APPOINTMENT (OUTPATIENT)
Age: 35
End: 2020-04-14

## 2020-04-14 VITALS — BODY MASS INDEX: 36.79 KG/M2 | WEIGHT: 257 LBS | HEIGHT: 70 IN

## 2020-04-14 LAB
BILIRUB UR QL STRIP: NORMAL
CLARITY UR: CLEAR
GLUCOSE UR-MCNC: NORMAL
HCG UR QL: NORMAL EU/DL
HGB UR QL STRIP.AUTO: NORMAL
KETONES UR-MCNC: 15
LEUKOCYTE ESTERASE UR QL STRIP: 25
NITRITE UR QL STRIP: NORMAL
PH UR STRIP: 5
PROT UR STRIP-MCNC: NORMAL
SP GR UR STRIP: 1.02

## 2020-04-14 PROCEDURE — 0502F SUBSEQUENT PRENATAL CARE: CPT

## 2020-04-15 DIAGNOSIS — O09.90 SUPERVISION OF HIGH RISK PREGNANCY, UNSPECIFIED, UNSPECIFIED TRIMESTER: ICD-10-CM

## 2020-04-15 DIAGNOSIS — O24.313 UNSPECIFIED PRE-EXISTING DIABETES MELLITUS IN PREGNANCY, THIRD TRIMESTER: ICD-10-CM

## 2020-04-15 DIAGNOSIS — O10.919 UNSPECIFIED PRE-EXISTING HYPERTENSION COMPLICATING PREGNANCY, UNSPECIFIED TRIMESTER: ICD-10-CM

## 2020-04-15 DIAGNOSIS — O09.529 SUPERVISION OF ELDERLY MULTIGRAVIDA, UNSPECIFIED TRIMESTER: ICD-10-CM

## 2020-04-15 DIAGNOSIS — Z3A.35 35 WEEKS GESTATION OF PREGNANCY: ICD-10-CM

## 2020-04-16 ENCOUNTER — APPOINTMENT (OUTPATIENT)
Dept: OBGYN | Facility: CLINIC | Age: 35
End: 2020-04-16

## 2020-04-17 ENCOUNTER — APPOINTMENT (OUTPATIENT)
Dept: ANTEPARTUM | Facility: CLINIC | Age: 35
End: 2020-04-17
Payer: COMMERCIAL

## 2020-04-17 ENCOUNTER — RESULT CHARGE (OUTPATIENT)
Age: 35
End: 2020-04-17

## 2020-04-17 ENCOUNTER — ASOB RESULT (OUTPATIENT)
Age: 35
End: 2020-04-17

## 2020-04-17 ENCOUNTER — OUTPATIENT (OUTPATIENT)
Dept: OUTPATIENT SERVICES | Facility: HOSPITAL | Age: 35
LOS: 1 days | Discharge: HOME | End: 2020-04-17

## 2020-04-17 VITALS
HEART RATE: 95 BPM | TEMPERATURE: 98.2 F | BODY MASS INDEX: 37.8 KG/M2 | WEIGHT: 264 LBS | HEIGHT: 70 IN | DIASTOLIC BLOOD PRESSURE: 64 MMHG | SYSTOLIC BLOOD PRESSURE: 122 MMHG | OXYGEN SATURATION: 98 %

## 2020-04-17 DIAGNOSIS — E66.9 OBESITY, UNSPECIFIED: ICD-10-CM

## 2020-04-17 DIAGNOSIS — O09.529 SUPERVISION OF ELDERLY MULTIGRAVIDA, UNSPECIFIED TRIMESTER: ICD-10-CM

## 2020-04-17 DIAGNOSIS — E11.9 TYPE 2 DIABETES MELLITUS W/OUT COMPLICATIONS: ICD-10-CM

## 2020-04-17 DIAGNOSIS — I10 ESSENTIAL (PRIMARY) HYPERTENSION: ICD-10-CM

## 2020-04-17 DIAGNOSIS — O09.90 SUPERVISION OF HIGH RISK PREGNANCY, UNSPECIFIED, UNSPECIFIED TRIMESTER: ICD-10-CM

## 2020-04-17 LAB
BILIRUB UR QL STRIP: NORMAL
CLARITY UR: CLEAR
COLLECTION METHOD: NORMAL
FETAL HEART RATE (BPM): NORMAL
FETAL MOVEMENT: PRESENT
GLUCOSE BLDC GLUCOMTR-MCNC: 145
GLUCOSE BLDC GLUCOMTR-MCNC: 145 MG/DL — HIGH (ref 70–99)
GLUCOSE UR-MCNC: NORMAL
HCG UR QL: 0.2 EU/DL
HGB UR QL STRIP.AUTO: NORMAL
KETONES UR-MCNC: NORMAL
LEUKOCYTE ESTERASE UR QL STRIP: NORMAL
NITRITE UR QL STRIP: NORMAL
OB COMMENTS: NORMAL
PH UR STRIP: 5
PROT UR STRIP-MCNC: NORMAL
SCHEDULED VISIT: YES
SP GR UR STRIP: 1.02
URINE ALBUMIN/PROTEIN: NORMAL
URINE GLUCOSE: NORMAL
URINE KETONES: NORMAL
WEEKS GESTATION: 36.1

## 2020-04-17 PROCEDURE — 76816 OB US FOLLOW-UP PER FETUS: CPT | Mod: 26

## 2020-04-17 PROCEDURE — 99214 OFFICE O/P EST MOD 30 MIN: CPT | Mod: 25

## 2020-04-17 PROCEDURE — 76818 FETAL BIOPHYS PROFILE W/NST: CPT | Mod: 26

## 2020-04-17 NOTE — DISCUSSION/SUMMARY
[FreeTextEntry1] : 33 yo  at 36w1d, co-managed with Dr. Velez for T2DM and CHTN and history of  delivery. \par Today she denies contractions, leakage of fluid, or vaginal bleeding. She feels regular fetal movement.\par She denies headache, vision changes, chest pain, shortness of breath, epigastric pain, or new onset swelling. \par \par T2DM on glucose monitor\par - FS today 145 one hour postprandial\par - FS log reviewed, fair control. Fasting , two hours postprandial . More than 50% of postprandial values elevated.\par - CURRENT REGIMEN: Levemir 32u/12U, Humalog 20-30U prior to all meals (patient estimates how much she needs from experience. Does not count carbohydrates)\par - She checks her fingerstick 1 hr postprandial, if >140, gives herself 1u per every 40 points above 140. \par - Fetal echo normal on 20\par - Hgb A1C 6.5% on 4/3/20 (scanned in from [facility])\par \par CHTN\par - Continues to take labetalol 200 mg BID\par - BP today 122/64\par - Has 1 day of BP log (134/90 yesterday), encouraged to keep daily logs\par - CBC and CMP WNL on 4/3/20 (scanned in from [facility]). Patient did not complete 24 hour urine collection.\par - Continue with daily aspirin\par \par Hx  delivery\par - Could not tolerate oral progesterone in early pregnancy which was prescribed by another provider.\par - Declined [place].\par \par Pregnancy\par - Continue with weekly NST/BPPs\par - Growth q month (normal growth today)\par - Recommend IOL at 37 weeks\par \par Prenatal care with Dr. Velez and Dr. Olvera\par Labor and fetal movement precautions given. \par

## 2020-04-17 NOTE — PHYSICAL EXAM
[FreeTextEntry1] : General: In no apparent distress.\par Cardiovascular: Regular rate and rhythm, normal S1/S2\par Pulmonary: Clear to auscultation bilaterally. No wheezing.\par Abdomen: Soft, gravid, nontender, nondistended, no rebound, no guarding\par Extremities: no calf tenderness or edema\par Psychiatry: Happy mood. Awake , alert, oriented to person, place, time.\par

## 2020-04-20 ENCOUNTER — NON-APPOINTMENT (OUTPATIENT)
Age: 35
End: 2020-04-20

## 2020-04-21 ENCOUNTER — LABORATORY RESULT (OUTPATIENT)
Age: 35
End: 2020-04-21

## 2020-04-21 DIAGNOSIS — O09.90 SUPERVISION OF HIGH RISK PREGNANCY, UNSPECIFIED, UNSPECIFIED TRIMESTER: ICD-10-CM

## 2020-04-21 DIAGNOSIS — O09.529 SUPERVISION OF ELDERLY MULTIGRAVIDA, UNSPECIFIED TRIMESTER: ICD-10-CM

## 2020-04-21 DIAGNOSIS — I10 ESSENTIAL (PRIMARY) HYPERTENSION: ICD-10-CM

## 2020-04-21 DIAGNOSIS — Z36.89 ENCOUNTER FOR OTHER SPECIFIED ANTENATAL SCREENING: ICD-10-CM

## 2020-04-21 DIAGNOSIS — E66.9 OBESITY, UNSPECIFIED: ICD-10-CM

## 2020-04-21 DIAGNOSIS — Z3A.36 36 WEEKS GESTATION OF PREGNANCY: ICD-10-CM

## 2020-04-21 DIAGNOSIS — E11.9 TYPE 2 DIABETES MELLITUS WITHOUT COMPLICATIONS: ICD-10-CM

## 2020-04-21 DIAGNOSIS — O26.849 UTERINE SIZE-DATE DISCREPANCY, UNSPECIFIED TRIMESTER: ICD-10-CM

## 2020-04-23 ENCOUNTER — INPATIENT (INPATIENT)
Facility: HOSPITAL | Age: 35
LOS: 1 days | Discharge: HOME | End: 2020-04-25
Attending: OBSTETRICS & GYNECOLOGY | Admitting: OBSTETRICS & GYNECOLOGY
Payer: COMMERCIAL

## 2020-04-23 VITALS — HEART RATE: 100 BPM | DIASTOLIC BLOOD PRESSURE: 71 MMHG | SYSTOLIC BLOOD PRESSURE: 125 MMHG

## 2020-04-23 LAB
ALBUMIN SERPL ELPH-MCNC: 3.4 G/DL — LOW (ref 3.5–5.2)
ALP SERPL-CCNC: 85 U/L — SIGNIFICANT CHANGE UP (ref 30–115)
ALT FLD-CCNC: 11 U/L — SIGNIFICANT CHANGE UP (ref 0–41)
AMPHET UR-MCNC: NEGATIVE — SIGNIFICANT CHANGE UP
ANION GAP SERPL CALC-SCNC: 15 MMOL/L — HIGH (ref 7–14)
APPEARANCE UR: CLEAR — SIGNIFICANT CHANGE UP
AST SERPL-CCNC: 13 U/L — SIGNIFICANT CHANGE UP (ref 0–41)
BACTERIA # UR AUTO: NEGATIVE — SIGNIFICANT CHANGE UP
BARBITURATES UR SCN-MCNC: NEGATIVE — SIGNIFICANT CHANGE UP
BASOPHILS # BLD AUTO: 0.03 K/UL — SIGNIFICANT CHANGE UP (ref 0–0.2)
BASOPHILS NFR BLD AUTO: 0.3 % — SIGNIFICANT CHANGE UP (ref 0–1)
BENZODIAZ UR-MCNC: NEGATIVE — SIGNIFICANT CHANGE UP
BILIRUB SERPL-MCNC: 0.3 MG/DL — SIGNIFICANT CHANGE UP (ref 0.2–1.2)
BILIRUB UR-MCNC: NEGATIVE — SIGNIFICANT CHANGE UP
BLD GP AB SCN SERPL QL: SIGNIFICANT CHANGE UP
BUN SERPL-MCNC: 10 MG/DL — SIGNIFICANT CHANGE UP (ref 10–20)
BUPRENORPHINE SCREEN, URINE RESULT: NEGATIVE — SIGNIFICANT CHANGE UP
CALCIUM SERPL-MCNC: 9.2 MG/DL — SIGNIFICANT CHANGE UP (ref 8.5–10.1)
CHLORIDE SERPL-SCNC: 102 MMOL/L — SIGNIFICANT CHANGE UP (ref 98–110)
CO2 SERPL-SCNC: 18 MMOL/L — SIGNIFICANT CHANGE UP (ref 17–32)
COCAINE METAB.OTHER UR-MCNC: NEGATIVE — SIGNIFICANT CHANGE UP
COLOR SPEC: YELLOW — SIGNIFICANT CHANGE UP
CREAT ?TM UR-MCNC: 35 MG/DL — SIGNIFICANT CHANGE UP
CREAT SERPL-MCNC: 0.5 MG/DL — LOW (ref 0.7–1.5)
DIFF PNL FLD: NEGATIVE — SIGNIFICANT CHANGE UP
EOSINOPHIL # BLD AUTO: 0.07 K/UL — SIGNIFICANT CHANGE UP (ref 0–0.7)
EOSINOPHIL NFR BLD AUTO: 0.6 % — SIGNIFICANT CHANGE UP (ref 0–8)
EPI CELLS # UR: 3 /HPF — SIGNIFICANT CHANGE UP (ref 0–5)
FENTANYL UR QL: POSITIVE
GLUCOSE BLDC GLUCOMTR-MCNC: 109 MG/DL — HIGH (ref 70–99)
GLUCOSE BLDC GLUCOMTR-MCNC: 109 MG/DL — HIGH (ref 70–99)
GLUCOSE BLDC GLUCOMTR-MCNC: 117 MG/DL — HIGH (ref 70–99)
GLUCOSE BLDC GLUCOMTR-MCNC: 78 MG/DL — SIGNIFICANT CHANGE UP (ref 70–99)
GLUCOSE SERPL-MCNC: 124 MG/DL — HIGH (ref 70–99)
GLUCOSE UR QL: NEGATIVE — SIGNIFICANT CHANGE UP
HCT VFR BLD CALC: 34 % — LOW (ref 37–47)
HGB BLD-MCNC: 12.1 G/DL — SIGNIFICANT CHANGE UP (ref 12–16)
HYALINE CASTS # UR AUTO: 7 /LPF — SIGNIFICANT CHANGE UP (ref 0–7)
IMM GRANULOCYTES NFR BLD AUTO: 0.5 % — HIGH (ref 0.1–0.3)
KETONES UR-MCNC: NEGATIVE — SIGNIFICANT CHANGE UP
L&D DRUG SCREEN, URINE: SIGNIFICANT CHANGE UP
LDH SERPL L TO P-CCNC: 117 — SIGNIFICANT CHANGE UP (ref 50–242)
LEUKOCYTE ESTERASE UR-ACNC: NEGATIVE — SIGNIFICANT CHANGE UP
LYMPHOCYTES # BLD AUTO: 19.3 % — LOW (ref 20.5–51.1)
LYMPHOCYTES # BLD AUTO: 2.3 K/UL — SIGNIFICANT CHANGE UP (ref 1.2–3.4)
MCHC RBC-ENTMCNC: 27.8 PG — SIGNIFICANT CHANGE UP (ref 27–31)
MCHC RBC-ENTMCNC: 35.6 G/DL — SIGNIFICANT CHANGE UP (ref 32–37)
MCV RBC AUTO: 78.2 FL — LOW (ref 81–99)
METHADONE UR-MCNC: NEGATIVE — SIGNIFICANT CHANGE UP
MONOCYTES # BLD AUTO: 0.57 K/UL — SIGNIFICANT CHANGE UP (ref 0.1–0.6)
MONOCYTES NFR BLD AUTO: 4.8 % — SIGNIFICANT CHANGE UP (ref 1.7–9.3)
NEUTROPHILS # BLD AUTO: 8.91 K/UL — HIGH (ref 1.4–6.5)
NEUTROPHILS NFR BLD AUTO: 74.5 % — SIGNIFICANT CHANGE UP (ref 42.2–75.2)
NITRITE UR-MCNC: NEGATIVE — SIGNIFICANT CHANGE UP
NRBC # BLD: 0 /100 WBCS — SIGNIFICANT CHANGE UP (ref 0–0)
OPIATES UR-MCNC: NEGATIVE — SIGNIFICANT CHANGE UP
OXYCODONE UR-MCNC: NEGATIVE — SIGNIFICANT CHANGE UP
PCP UR-MCNC: NEGATIVE — SIGNIFICANT CHANGE UP
PH UR: 6.5 — SIGNIFICANT CHANGE UP (ref 5–8)
PLATELET # BLD AUTO: 272 K/UL — SIGNIFICANT CHANGE UP (ref 130–400)
POTASSIUM SERPL-MCNC: 4.3 MMOL/L — SIGNIFICANT CHANGE UP (ref 3.5–5)
POTASSIUM SERPL-SCNC: 4.3 MMOL/L — SIGNIFICANT CHANGE UP (ref 3.5–5)
PRENATAL SYPHILIS TEST: SIGNIFICANT CHANGE UP
PROPOXYPHENE QUALITATIVE URINE RESULT: NEGATIVE — SIGNIFICANT CHANGE UP
PROT ?TM UR-MCNC: 6 MG/DLG/24H — SIGNIFICANT CHANGE UP
PROT SERPL-MCNC: 6.3 G/DL — SIGNIFICANT CHANGE UP (ref 6–8)
PROT UR-MCNC: ABNORMAL
PROT/CREAT UR-RTO: 0.2 RATIO — SIGNIFICANT CHANGE UP (ref 0–0.2)
RBC # BLD: 4.35 M/UL — SIGNIFICANT CHANGE UP (ref 4.2–5.4)
RBC # FLD: 13 % — SIGNIFICANT CHANGE UP (ref 11.5–14.5)
RBC CASTS # UR COMP ASSIST: 0 /HPF — SIGNIFICANT CHANGE UP (ref 0–4)
SODIUM SERPL-SCNC: 135 MMOL/L — SIGNIFICANT CHANGE UP (ref 135–146)
SP GR SPEC: 1.03 — HIGH (ref 1.01–1.02)
URATE SERPL-MCNC: 5.1 MG/DL — SIGNIFICANT CHANGE UP (ref 2.5–7)
UROBILINOGEN FLD QL: SIGNIFICANT CHANGE UP
WBC # BLD: 11.94 K/UL — HIGH (ref 4.8–10.8)
WBC # FLD AUTO: 11.94 K/UL — HIGH (ref 4.8–10.8)
WBC UR QL: 3 /HPF — SIGNIFICANT CHANGE UP (ref 0–5)

## 2020-04-23 PROCEDURE — 59400 OBSTETRICAL CARE: CPT

## 2020-04-23 RX ORDER — OXYTOCIN 10 UNIT/ML
333.33 VIAL (ML) INJECTION
Qty: 20 | Refills: 0 | Status: DISCONTINUED | OUTPATIENT
Start: 2020-04-23 | End: 2020-04-24

## 2020-04-23 RX ORDER — DINOPROSTONE 10 MG/241MG
10 INSERT VAGINAL ONCE
Refills: 0 | Status: COMPLETED | OUTPATIENT
Start: 2020-04-23 | End: 2020-04-23

## 2020-04-23 RX ORDER — BUTORPHANOL TARTRATE 2 MG/ML
1 INJECTION, SOLUTION INTRAMUSCULAR; INTRAVENOUS ONCE
Refills: 0 | Status: DISCONTINUED | OUTPATIENT
Start: 2020-04-23 | End: 2020-04-23

## 2020-04-23 RX ORDER — LABETALOL HCL 100 MG
100 TABLET ORAL ONCE
Refills: 0 | Status: COMPLETED | OUTPATIENT
Start: 2020-04-23 | End: 2020-04-23

## 2020-04-23 RX ORDER — DIPHENHYDRAMINE HCL 50 MG
25 CAPSULE ORAL ONCE
Refills: 0 | Status: COMPLETED | OUTPATIENT
Start: 2020-04-23 | End: 2020-04-23

## 2020-04-23 RX ORDER — CITRIC ACID/SODIUM CITRATE 300-500 MG
15 SOLUTION, ORAL ORAL EVERY 6 HOURS
Refills: 0 | Status: DISCONTINUED | OUTPATIENT
Start: 2020-04-23 | End: 2020-04-24

## 2020-04-23 RX ORDER — DIPHENHYDRAMINE HCL 50 MG
50 CAPSULE ORAL ONCE
Refills: 0 | Status: COMPLETED | OUTPATIENT
Start: 2020-04-23 | End: 2020-04-23

## 2020-04-23 RX ORDER — SODIUM CHLORIDE 9 MG/ML
1000 INJECTION, SOLUTION INTRAVENOUS
Refills: 0 | Status: DISCONTINUED | OUTPATIENT
Start: 2020-04-23 | End: 2020-04-24

## 2020-04-23 RX ADMIN — BUTORPHANOL TARTRATE 1 MILLIGRAM(S): 2 INJECTION, SOLUTION INTRAMUSCULAR; INTRAVENOUS at 14:50

## 2020-04-23 RX ADMIN — BUTORPHANOL TARTRATE 1 MILLIGRAM(S): 2 INJECTION, SOLUTION INTRAMUSCULAR; INTRAVENOUS at 17:44

## 2020-04-23 RX ADMIN — Medication 100 MILLIGRAM(S): at 19:56

## 2020-04-23 RX ADMIN — DINOPROSTONE 10 MILLIGRAM(S): 10 INSERT VAGINAL at 10:30

## 2020-04-23 RX ADMIN — BUTORPHANOL TARTRATE 1 MILLIGRAM(S): 2 INJECTION, SOLUTION INTRAMUSCULAR; INTRAVENOUS at 14:49

## 2020-04-23 RX ADMIN — Medication 25 MILLIGRAM(S): at 14:48

## 2020-04-23 RX ADMIN — Medication 50 MILLIGRAM(S): at 17:44

## 2020-04-23 NOTE — OB PROVIDER H&P - FAMILY HISTORY
Father  Still living? Unknown  Family history of diabetes mellitus in father, Age at diagnosis: Age Unknown     Mother  Still living? Unknown  Family history of MS (multiple sclerosis), Age at diagnosis: Age Unknown

## 2020-04-23 NOTE — OB PROVIDER H&P - NSHPLABSRESULTS_GEN_ALL_CORE
measles non-immune     4/3/20 HbA1c 6.5%   PELs WNL, never did 24hr UTP measles non-immune     4/3/20 HbA1c 6.5%   PELs WNL, never did 24hr UTP     ega 12.2w- SIUP, NT WNL   ega 17.1w- efw 214g; transverse, 3vc, ant placenta, cervix 4.5cm; afv nl   ega 22.2w- efw 439g (31%) transverse, 3vc, ant placenta, cervix 3.8cm; normal anatomy   ega 28.1w- efw 1155g (32%) cephalic, ant placenta, MVP 5.9cm; BPP 8/8   ega 29.1w- BPP 8/8; MVP 4cm   ega 30.1w- BPP 8/8; MVP 5.9cm   ega 32.1w- efw 1718g (16%) MVP 3.8cm; BPP 8/10   ega 33.1w- BPP 10/10 MVP 5.2cm   ega 34.1w- BPP 10/10 MVP 4.5cm   ega 35.1w- BPP 10/10 MVP 4.4cm   ega 36.1w- efw 2498g (18%) cephalic, post placenta; MVP 4.8cm; BPP 10/10

## 2020-04-23 NOTE — OB PROVIDER H&P - NSHPPHYSICALEXAM_GEN_ALL_CORE
Vital Signs Last 24 Hrs  T(C): 36.4 (23 Apr 2020 09:54), Max: 36.4 (23 Apr 2020 09:54)  T(F): 97.5 (23 Apr 2020 09:54), Max: 97.5 (23 Apr 2020 09:54)  HR: 87 (23 Apr 2020 11:02) (87 - 100)  BP: 127/83 (23 Apr 2020 11:02) (125/71 - 131/76)  RR: 18 (23 Apr 2020 09:54) (18 - 18)    EFM: 145/mod/accels+  [place]: irritable   SVE: 1/50/-3, vtx by sono, intact

## 2020-04-23 NOTE — OB PROVIDER H&P - ASSESSMENT
35yo  at 35yo  at 37w GA, GBS neg, IOL with cervidil for cHTN and GDMA2 on insulin   - admit to L&D   - admission labs  - continuous EFM/toco  - clear liquids   - pain mgmt prn   - IV hydration   - FS q4h in latent labor and q2h in active labor   - BPs q15min   - preeclamptic labs, Upr/cr ratio     Dr. Velez aware

## 2020-04-23 NOTE — PROGRESS NOTE ADULT - SUBJECTIVE AND OBJECTIVE BOX
OBSCOUTN PGY3 Note:     Patient seen and examined at bedside. Reports painful ctx, desires pain relief.      T(C): 36.4 (20 @ 09:54), Max: 36.4 (20 @ 09:54)  HR: 76 (20 @ 17:17) (72 - 100)  BP: 119/66 (20 @ 17:17) (108/54 - 178/77) @1348 178/77 (isolated elevated severe range BP)-> 130/78  RR: 18 (20 @ 09:54) (18 - 18)    EFM: 135/mod/accels+  [place]: irritable	  SVE: 2/L/-3, vtx, intact    Meds:  citric acid/sodium citrate Solution 15 milliLiter(s) Oral every 6 hours PRN  diphenhydrAMINE   Injectable 50 milliGRAM(s) IV Push once  lactated ringers. 1000 milliLiter(s) IV Continuous <Continuous>    1030 cervidil   1449 stadol/benadryl       Labs:    Upr/cr ratio pending       FS: 117/109                   12.1   11.94 )-----------( 272      ( 2020 10:13 )             34.0         135  |  102  |  10  ----------------------------<  124<H>  4.3   |  18  |  0.5<L>    Ca    9.2      2020 10:13    TPro  6.3  /  Alb  3.4<L>  /  TBili  0.3  /  DBili  x   /  AST  13  /  ALT  11  /  AlkPhos  85    Urinalysis Basic - ( 2020 10:13 )    Color: Yellow / Appearance: Clear / S.026 / pH: x  Gluc: x / Ketone: Negative  / Bili: Negative / Urobili: <2 mg/dL   Blood: x / Protein: 30 mg/dL / Nitrite: Negative   Leuk Esterase: Negative / RBC: 0 /HPF / WBC 3 /HPF   Sq Epi: x / Non Sq Epi: 3 /HPF / Bacteria: Negative         Prenatal Syphilis Test: Nonreact (20 @ 10:13)  ABO RH Interpretation: B POS (20 @ 10:13)      UDS pos for fentanyl, confirmation pending OBGYN PGY3 Note:     Patient seen and examined at bedside. Reports painful ctx, desires pain relief.      T(C): 36.4 (20 @ 09:54), Max: 36.4 (20 @ 09:54)  HR: 76 (20 @ 17:17) (72 - 100)  BP: 119/66 (20 @ 17:17) (108/54 - 178/77) @1348 178/77 (isolated elevated severe range BP)-> 130/78  RR: 18 (20 @ 09:54) (18 - 18)    EFM: 135/mod/accels+  [place]: irritable	  SVE: 2/50/-3, vtx, intact    Meds:  citric acid/sodium citrate Solution 15 milliLiter(s) Oral every 6 hours PRN  diphenhydrAMINE   Injectable 50 milliGRAM(s) IV Push once  lactated ringers. 1000 milliLiter(s) IV Continuous <Continuous>    1030 cervidil   1449 stadol/benadryl       Labs:    Upr/cr ratio pending       FS: 117/109                   12.1   11.94 )-----------( 272      ( 2020 10:13 )             34.0         135  |  102  |  10  ----------------------------<  124<H>  4.3   |  18  |  0.5<L>    Ca    9.2      2020 10:13    TPro  6.3  /  Alb  3.4<L>  /  TBili  0.3  /  DBili  x   /  AST  13  /  ALT  11  /  AlkPhos  85    Urinalysis Basic - ( 2020 10:13 )    Color: Yellow / Appearance: Clear / S.026 / pH: x  Gluc: x / Ketone: Negative  / Bili: Negative / Urobili: <2 mg/dL   Blood: x / Protein: 30 mg/dL / Nitrite: Negative   Leuk Esterase: Negative / RBC: 0 /HPF / WBC 3 /HPF   Sq Epi: x / Non Sq Epi: 3 /HPF / Bacteria: Negative         Prenatal Syphilis Test: Nonreact (20 @ 10:13)  ABO RH Interpretation: B POS (20 @ 10:13)      UDS pos for fentanyl, confirmation pending

## 2020-04-23 NOTE — PROGRESS NOTE ADULT - SUBJECTIVE AND OBJECTIVE BOX
PGY1 Note    Patient seen at bedside for evaluation of labor progression, doing well, no complaints.     Vital Signs Last 24 Hrs  T(C): 36.4 (2020 09:54), Max: 36.4 (2020 09:54)  T(F): 97.5 (2020 09:54), Max: 97.5 (2020 09:54)  HR: 85 (2020 23:02) (72 - 100)  BP: 139/75 (2020 23:02) (108/54 - 178/77)  RR: 18 (2020 09:54) (18 - 18)  SpO2: 98% (2020 22:37) (98% - 99%)    EFM: 135/mod raman/+accels  TOCO: no contractions  SVE: 2/50/-3, vtx, intact    Medications:  butorphanol Injectable: 1 milliGRAM(s) (20 @ 14:49)  butorphanol Injectable: 1 milliGRAM(s) (20 @ 14:50)  butorphanol Injectable: 1 milliGRAM(s) (20 @ 17:44)  butorphanol Injectable: 1 milliGRAM(s) (20 @ 17:44)  dinoprostone Insert: 10 milliGRAM(s) (20 @ 10:30) cervidil out @2230  diphenhydrAMINE   Injectable: 25 milliGRAM(s) (20 @ 14:48)  diphenhydrAMINE   Injectable: 50 milliGRAM(s) (20 @ 17:44)  labetalol: 100 milliGRAM(s) (20 @ 19:56)      Labs:                        12.1   11.94 )-----------( 272      ( 2020 10:13 )             34.0         135  |  102  |  10  ----------------------------<  124<H>  4.3   |  18  |  0.5<L>    Ca    9.2      2020 10:13    TPro  6.3  /  Alb  3.4<L>  /  TBili  0.3  /  DBili  x   /  AST  13  /  ALT  11  /  AlkPhos  85  -    ABO RH Interpretation: B POS (20 @ 10:13)    Antibody Screen: NEG (20 @ 10:13)    Urinalysis Basic - ( 2020 10:13 )    Color: Yellow / Appearance: Clear / S.026 / pH: x  Gluc: x / Ketone: Negative  / Bili: Negative / Urobili: <2 mg/dL   Blood: x / Protein: 30 mg/dL / Nitrite: Negative   Leuk Esterase: Negative / RBC: 0 /HPF / WBC 3 /HPF   Sq Epi: x / Non Sq Epi: 3 /HPF / Bacteria: Negative      L&amp;D Drug Screen, Urine: Done (20 @ 10:13)    Prenatal Syphilis Test: Nonreact (20 @ 10:13)    Uric Acid, Serum: 5.1 mg/dL (20 @ 10:13)    Lactate Dehydrogenase, Serum: 117 (20 @ 10:13)    Protein/Creatinine Ratio Calculation: 0.2 Ratio (20 @ 15:00)        A/P  35yo  at 37w GA, GBS neg, IOL with cervidil for cHTN and GDMA2 on insulin, BPs currently well-controlled, s/p cervidil, not amy  - continuous EFM/tocol  - clear liquids   - IV hydration   - FS q4h in latent labor and q2h in active labor   - BPs q15min   - UDS pos for fentantyl; confirmation pending. Pt takes labetalol which has cross-reactivity with fentanyl. Pt denies taking any other medications/drug use. Discussed with peds and patient.     Dr. Velez and Dr. Carter aware

## 2020-04-23 NOTE — PROGRESS NOTE ADULT - ASSESSMENT
33yo  at 37w GA, GBS neg, IOL with cervidil for cHTN and GDMA2 on insulin   - continuous EFM/toco  - stadol/benadryl  - clear liquids   - IV hydration   - FS q4h in latent labor and q2h in active labor   - BPs q15min   - f/u Upr/cr ratio  - UDS pos for fentantyl; confirmation pending. Pt takes labetalol which has cross-reactivity with fentanyl. Pt denies taking any other medications/drug use. Discussed with peds and patient.     Dr. Velez aware

## 2020-04-23 NOTE — OB PROVIDER H&P - HISTORY OF PRESENT ILLNESS
35yo  at 37w GA, by LMP c/w 1st trim sono, for IOL in view of GDMA2 and cHTN comanaged with HRC. Reports irregular ctx, denies VB/LOF. Good FM. GDMA2 on insulin (levemir 30/12; lispro 20-30U with meals); fasting FS range: ; 2h PP range: 120-140. cHTN on labetalol 200mg BID, does not take BPs at home. Denies headache, blurred vision, RUQ/epigastric pain, new onset swelling. Denies other complications during this pregnancy. 33yo  at 37w GA, by LMP c/w 1st trim sono, for IOL in view of GDMA2 and cHTN comanaged with HRC. Reports irregular ctx, denies VB/LOF. Good FM. GDMA2 on insulin (levemir 30/12; lispro 20-30U with meals); fasting FS range: ; 2h PP range: 120-140. cHTN on labetalol 200mg BID, does not take BPs at home. Baseline PELs WNL, never did 24hr UTP. Denies headache, blurred vision, RUQ/epigastric pain, new onset swelling. H/o  delivery at 36+ weeks, declined IM progesterone. Denies other complications during this pregnancy. 33yo  at 37w GA, by LMP c/w 1st trim sono, for IOL in view of GDMA2 and cHTN comanaged with HRC being followed with weekly NST/BPP. Reports irregular ctx, denies VB/LOF. Good FM. GDMA2 on insulin (levemir 30/12; lispro 20-30U with meals); fasting FS range: ; 2h PP range: 120-140. cHTN on labetalol 200mg BID, does not take BPs at home. Baseline PELs WNL, never did 24hr UTP. Denies headache, blurred vision, RUQ/epigastric pain, new onset swelling. H/o  delivery at 36+ weeks, declined IM progesterone. Denies other complications during this pregnancy.

## 2020-04-24 ENCOUNTER — TRANSCRIPTION ENCOUNTER (OUTPATIENT)
Age: 35
End: 2020-04-24

## 2020-04-24 ENCOUNTER — APPOINTMENT (OUTPATIENT)
Dept: ANTEPARTUM | Facility: CLINIC | Age: 35
End: 2020-04-24

## 2020-04-24 LAB
GLUCOSE BLDC GLUCOMTR-MCNC: 167 MG/DL — HIGH (ref 70–99)
GLUCOSE BLDC GLUCOMTR-MCNC: 95 MG/DL — SIGNIFICANT CHANGE UP (ref 70–99)
HIV 1+2 AB+HIV1 P24 AG SERPL QL IA: SIGNIFICANT CHANGE UP

## 2020-04-24 RX ORDER — ACETAMINOPHEN 500 MG
3 TABLET ORAL
Qty: 0 | Refills: 0 | DISCHARGE
Start: 2020-04-24

## 2020-04-24 RX ORDER — LABETALOL HCL 100 MG
100 TABLET ORAL EVERY 12 HOURS
Refills: 0 | Status: DISCONTINUED | OUTPATIENT
Start: 2020-04-24 | End: 2020-04-25

## 2020-04-24 RX ORDER — DIPHENHYDRAMINE HCL 50 MG
25 CAPSULE ORAL EVERY 6 HOURS
Refills: 0 | Status: DISCONTINUED | OUTPATIENT
Start: 2020-04-24 | End: 2020-04-25

## 2020-04-24 RX ORDER — BUPIVACAINE HCL/PF 7.5 MG/ML
200 VIAL (ML) INJECTION
Refills: 0 | Status: DISCONTINUED | OUTPATIENT
Start: 2020-04-24 | End: 2020-04-24

## 2020-04-24 RX ORDER — LABETALOL HCL 100 MG
100 TABLET ORAL ONCE
Refills: 0 | Status: DISCONTINUED | OUTPATIENT
Start: 2020-04-24 | End: 2020-04-24

## 2020-04-24 RX ORDER — SODIUM CHLORIDE 9 MG/ML
3 INJECTION INTRAMUSCULAR; INTRAVENOUS; SUBCUTANEOUS EVERY 8 HOURS
Refills: 0 | Status: DISCONTINUED | OUTPATIENT
Start: 2020-04-24 | End: 2020-04-25

## 2020-04-24 RX ORDER — ACETAMINOPHEN 500 MG
975 TABLET ORAL
Refills: 0 | Status: DISCONTINUED | OUTPATIENT
Start: 2020-04-24 | End: 2020-04-25

## 2020-04-24 RX ORDER — BENZOCAINE 10 %
1 GEL (GRAM) MUCOUS MEMBRANE EVERY 6 HOURS
Refills: 0 | Status: DISCONTINUED | OUTPATIENT
Start: 2020-04-24 | End: 2020-04-25

## 2020-04-24 RX ORDER — OXYCODONE HYDROCHLORIDE 5 MG/1
5 TABLET ORAL
Refills: 0 | Status: DISCONTINUED | OUTPATIENT
Start: 2020-04-24 | End: 2020-04-25

## 2020-04-24 RX ORDER — HYDROCORTISONE 1 %
1 OINTMENT (GRAM) TOPICAL EVERY 6 HOURS
Refills: 0 | Status: DISCONTINUED | OUTPATIENT
Start: 2020-04-24 | End: 2020-04-25

## 2020-04-24 RX ORDER — OXYTOCIN 10 UNIT/ML
2 VIAL (ML) INJECTION
Qty: 30 | Refills: 0 | Status: DISCONTINUED | OUTPATIENT
Start: 2020-04-24 | End: 2020-04-24

## 2020-04-24 RX ORDER — SIMETHICONE 80 MG/1
80 TABLET, CHEWABLE ORAL EVERY 4 HOURS
Refills: 0 | Status: DISCONTINUED | OUTPATIENT
Start: 2020-04-24 | End: 2020-04-25

## 2020-04-24 RX ORDER — LABETALOL HCL 100 MG
1 TABLET ORAL
Qty: 0 | Refills: 0 | DISCHARGE
Start: 2020-04-24

## 2020-04-24 RX ORDER — PRAMOXINE HYDROCHLORIDE 150 MG/15G
1 AEROSOL, FOAM RECTAL EVERY 4 HOURS
Refills: 0 | Status: DISCONTINUED | OUTPATIENT
Start: 2020-04-24 | End: 2020-04-25

## 2020-04-24 RX ORDER — DIBUCAINE 1 %
1 OINTMENT (GRAM) RECTAL EVERY 6 HOURS
Refills: 0 | Status: DISCONTINUED | OUTPATIENT
Start: 2020-04-24 | End: 2020-04-25

## 2020-04-24 RX ORDER — GLYCERIN ADULT
1 SUPPOSITORY, RECTAL RECTAL AT BEDTIME
Refills: 0 | Status: DISCONTINUED | OUTPATIENT
Start: 2020-04-24 | End: 2020-04-25

## 2020-04-24 RX ORDER — NALOXONE HYDROCHLORIDE 4 MG/.1ML
0.1 SPRAY NASAL
Refills: 0 | Status: DISCONTINUED | OUTPATIENT
Start: 2020-04-24 | End: 2020-04-24

## 2020-04-24 RX ORDER — IBUPROFEN 200 MG
600 TABLET ORAL EVERY 6 HOURS
Refills: 0 | Status: COMPLETED | OUTPATIENT
Start: 2020-04-24 | End: 2021-03-23

## 2020-04-24 RX ORDER — LANOLIN
1 OINTMENT (GRAM) TOPICAL EVERY 6 HOURS
Refills: 0 | Status: DISCONTINUED | OUTPATIENT
Start: 2020-04-24 | End: 2020-04-25

## 2020-04-24 RX ORDER — DEXAMETHASONE 0.5 MG/5ML
4 ELIXIR ORAL EVERY 6 HOURS
Refills: 0 | Status: DISCONTINUED | OUTPATIENT
Start: 2020-04-24 | End: 2020-04-24

## 2020-04-24 RX ORDER — AER TRAVELER 0.5 G/1
1 SOLUTION RECTAL; TOPICAL EVERY 4 HOURS
Refills: 0 | Status: DISCONTINUED | OUTPATIENT
Start: 2020-04-24 | End: 2020-04-25

## 2020-04-24 RX ORDER — IBUPROFEN 200 MG
600 TABLET ORAL EVERY 6 HOURS
Refills: 0 | Status: DISCONTINUED | OUTPATIENT
Start: 2020-04-24 | End: 2020-04-25

## 2020-04-24 RX ORDER — OXYTOCIN 10 UNIT/ML
333.33 VIAL (ML) INJECTION
Qty: 20 | Refills: 0 | Status: DISCONTINUED | OUTPATIENT
Start: 2020-04-24 | End: 2020-04-25

## 2020-04-24 RX ORDER — KETOROLAC TROMETHAMINE 30 MG/ML
30 SYRINGE (ML) INJECTION ONCE
Refills: 0 | Status: DISCONTINUED | OUTPATIENT
Start: 2020-04-24 | End: 2020-04-24

## 2020-04-24 RX ORDER — OXYCODONE HYDROCHLORIDE 5 MG/1
5 TABLET ORAL ONCE
Refills: 0 | Status: DISCONTINUED | OUTPATIENT
Start: 2020-04-24 | End: 2020-04-25

## 2020-04-24 RX ORDER — ONDANSETRON 8 MG/1
4 TABLET, FILM COATED ORAL EVERY 6 HOURS
Refills: 0 | Status: DISCONTINUED | OUTPATIENT
Start: 2020-04-24 | End: 2020-04-24

## 2020-04-24 RX ORDER — MAGNESIUM HYDROXIDE 400 MG/1
30 TABLET, CHEWABLE ORAL
Refills: 0 | Status: DISCONTINUED | OUTPATIENT
Start: 2020-04-24 | End: 2020-04-25

## 2020-04-24 RX ORDER — IBUPROFEN 200 MG
1 TABLET ORAL
Qty: 0 | Refills: 0 | DISCHARGE
Start: 2020-04-24

## 2020-04-24 RX ADMIN — Medication 100 MILLIGRAM(S): at 09:17

## 2020-04-24 RX ADMIN — Medication 1 TABLET(S): at 12:58

## 2020-04-24 RX ADMIN — Medication 975 MILLIGRAM(S): at 21:48

## 2020-04-24 RX ADMIN — Medication 100 MILLIGRAM(S): at 21:48

## 2020-04-24 RX ADMIN — Medication 975 MILLIGRAM(S): at 17:00

## 2020-04-24 RX ADMIN — Medication 600 MILLIGRAM(S): at 23:31

## 2020-04-24 RX ADMIN — Medication 30 MILLIGRAM(S): at 08:23

## 2020-04-24 RX ADMIN — Medication 600 MILLIGRAM(S): at 14:11

## 2020-04-24 RX ADMIN — Medication 1 SPRAY(S): at 16:35

## 2020-04-24 RX ADMIN — Medication 2 MILLIUNIT(S)/MIN: at 00:56

## 2020-04-24 RX ADMIN — SODIUM CHLORIDE 3 MILLILITER(S): 9 INJECTION INTRAMUSCULAR; INTRAVENOUS; SUBCUTANEOUS at 21:44

## 2020-04-24 RX ADMIN — Medication 975 MILLIGRAM(S): at 10:32

## 2020-04-24 RX ADMIN — SODIUM CHLORIDE 3 MILLILITER(S): 9 INJECTION INTRAMUSCULAR; INTRAVENOUS; SUBCUTANEOUS at 15:57

## 2020-04-24 NOTE — DISCHARGE NOTE OB - CARE PROVIDER_API CALL
Neymar Velez)  Obstetrics and Gynecology  [address] [place] [address]  Phone: [phone]  Fax: [phone]  Follow Up Time: Routine

## 2020-04-24 NOTE — DISCHARGE NOTE OB - SECONDARY DIAGNOSIS.
Chronic hypertension Gestational diabetes mellitus (GDM) Type 2 diabetes mellitus without complication, unspecified whether long term insulin use

## 2020-04-24 NOTE — OB PROVIDER DELIVERY SUMMARY - NSPROVIDERDELIVERYNOTE_OBGYN_ALL_OB_FT
IN BIRTHING ROOM. PATIENT EXAMINED AND FULLY DILATED +1 TO + 2 STATION.  BABY GIRL OVER INTACT PERINEUM . BABY SUCTIONED ON PERINEUM AND AFTER DELIVERY OF BODY, CORD CLAMPED X 2 AND CUT AND BABY HANDED TO NURSE AND TO MOTHER.  CORD BLOODS TAKEN FOR ROUTINE.  CERVIX, VAGINA AND PERINEUM CHECKED. SMALL 1ST DEGREE LACERATION OF PERINEUM AND VAGINA, NOTED AND REPAIRED WITH  2-0 CHOMIC SUTURE. PLACENTA DELIVERED SPONTANEOUSLY AND APPEARS INTACT. GOOD HEMOSTASIS. APGARS 9-9.  [employer].

## 2020-04-24 NOTE — DISCHARGE NOTE OB - MEDICATION SUMMARY - MEDICATIONS TO TAKE
I will START or STAY ON the medications listed below when I get home from the hospital:    acetaminophen 325 mg oral tablet  -- 3 tab(s) by mouth   -- Indication: For mild pain as needed    ibuprofen 600 mg oral tablet  -- 1 tab(s) by mouth every 6 hours  -- Indication: For moderate as needed    Prenatal Multivitamins with Folic Acid 1 mg oral tablet  -- 1 tab(s) by mouth once a day  -- Indication: For postpartum state I will START or STAY ON the medications listed below when I get home from the hospital:    acetaminophen 325 mg oral tablet  -- 3 tab(s) by mouth   -- Indication: For mild pain as needed    ibuprofen 600 mg oral tablet  -- 1 tab(s) by mouth every 6 hours  -- Indication: For moderate as needed    labetalol 100 mg oral tablet  -- 1 tab(s) by mouth every 12 hours  -- Indication: For Chronic hypertension    Prenatal Multivitamins with Folic Acid 1 mg oral tablet  -- 1 tab(s) by mouth once a day  -- Indication: For postpartum state I will START or STAY ON the medications listed below when I get home from the hospital:    acetaminophen 325 mg oral tablet  -- 3 tab(s) by mouth   -- Indication: For mild pain as needed    ibuprofen 600 mg oral tablet  -- 1 tab(s) by mouth every 6 hours  -- Indication: For moderate as needed    Levemir  -- 16 unit(s) subcutaneous once a day (in the morning)  -- Indication: For diabetes    Levemir  -- 6 unit(s) subcutaneous once a day (in the evening)  -- Indication: For diabetes    HumaLOG  -- 10 unit(s) subcutaneous 3 times a day (with meals)  -- Indication: For diabetes    labetalol 100 mg oral tablet  -- 1 tab(s) by mouth every 12 hours  -- Indication: For Chronic hypertension    Prenatal Multivitamins with Folic Acid 1 mg oral tablet  -- 1 tab(s) by mouth once a day  -- Indication: For postpartum state

## 2020-04-24 NOTE — DISCHARGE NOTE OB - PLAN OF CARE
healthy recovery No heavy lifting.   Nothing inside the vagina for 6 weeks: no tampons, douching, sexual intercourse, tub baths or pools.   If you have a fever over 100.4F, severe abdominal pain or heavy vaginal bleeding please call your doctor or go to the emergency room.  Please follow up with your OBGYN in 6 weeks for a postpartum visit. normal blood pressures Take your labetalol 100mg twice per day as your doctor.  If you have headaches, vision changes, chest pain, shortness of breath, pain in your right upper abdomen, new swelling please call your doctor and go to the emergency room. Resolution of disease Please complete the 75g two hour glucose tolerance test around 5 weeks postpartum as per your doctor. Normal blood glucose Continue glucose monitoring at home. Decrease insulin regimen to half dose until follow up with endocrinologist Take your labetalol 100mg twice per day as your doctor. Followup in 1 week for BP check  If you have headaches, vision changes, chest pain, shortness of breath, pain in your right upper abdomen, new swelling please call your doctor and go to the emergency room.

## 2020-04-24 NOTE — PROGRESS NOTE ADULT - SUBJECTIVE AND OBJECTIVE BOX
PGY1 Note    Patient seen at bedside for evaluation of labor progression, doing well, no complaints.     Vital Signs Last 24 Hrs  T(C): 36.4 (2020 09:54), Max: 36.4 (2020 09:54)  T(F): 97.5 (2020 09:54), Max: 97.5 (2020 09:54)  HR: 76 (2020 02:25) (72 - 116)  BP: 117/64 (2020 02:19) (108/54 - 197/86)  RR: 18 (2020 00:54) (18 - 18)  SpO2: 98% (2020 02:30) (94% - 100%)    EFM: 135/mod raman/+accels  TOCO: unable to measures  SVE: 2/50/-3, vtx, intact    Medications:  butorphanol Injectable: 1 milliGRAM(s) (20 @ 17:44)  butorphanol Injectable: 1 milliGRAM(s) (20 @ 17:44)  butorphanol Injectable: 1 milliGRAM(s) (20 @ 14:49)  butorphanol Injectable: 1 milliGRAM(s) (20 @ 14:50)  dinoprostone Insert: 10 milliGRAM(s) (20 @ 10:30)  diphenhydrAMINE   Injectable: 25 milliGRAM(s) (20 @ 14:48)  diphenhydrAMINE   Injectable: 50 milliGRAM(s) (20 @ 17:44)  labetalol: 100 milliGRAM(s) (20 @ 19:56)  oxytocin Infusion: 2 mL/Hr (20 @ 00:13) currently @10mU/min      Labs:                        12.1   11.94 )-----------( 272      ( 2020 10:13 )             34.0         135  |  102  |  10  ----------------------------<  124<H>  4.3   |  18  |  0.5<L>    Ca    9.2      2020 10:13    TPro  6.3  /  Alb  3.4<L>  /  TBili  0.3  /  DBili  x   /  AST  13  /  ALT  11  /  AlkPhos  85  -    ABO RH Interpretation: B POS (20 @ 10:13)    Antibody Screen: NEG (20 @ 10:13)    Urinalysis Basic - ( 2020 10:13 )    Color: Yellow / Appearance: Clear / S.026 / pH: x  Gluc: x / Ketone: Negative  / Bili: Negative / Urobili: <2 mg/dL   Blood: x / Protein: 30 mg/dL / Nitrite: Negative   Leuk Esterase: Negative / RBC: 0 /HPF / WBC 3 /HPF   Sq Epi: x / Non Sq Epi: 3 /HPF / Bacteria: Negative      L&amp;D Drug Screen, Urine: Done (20 @ 10:13)    Prenatal Syphilis Test: Nonreact (20 @ 10:13)    Uric Acid, Serum: 5.1 mg/dL (20 @ 10:13)    Lactate Dehydrogenase, Serum: 117 (20 @ 10:13)    Protein/Creatinine Ratio Calculation: 0.2 Ratio (20 @ 15:00)    COVID neg        A/P  35yo  at 37w1d GA, GBS neg, IOL with cervidil for cHTN and GDMA2 on insulin, BPs currently well-controlled, s/p cervidil, s/p epidural, on pitocin  -continue pitocin  - continuous EFM/tocol  - clear liquids   - IV hydration   - FS q4h in latent labor and q2h in active labor   - BPs q15min   - UDS pos for fentantyl; confirmation pending. Pt takes labetalol which has cross-reactivity with fentanyl. Pt denies taking any other medications/drug use. Discussed with peds and patient.     Dr. Velez and Dr. Carter aware

## 2020-04-24 NOTE — DISCHARGE NOTE OB - HOSPITAL COURSE
Patient admitted for induction of labor, underwent uncomplicated vaginal delivery and had an uncomplicated postpartum course, discharged on PPD 1. Patient admitted for induction of labor, underwent uncomplicated vaginal delivery and had an uncomplicated postpartum course, discharged on PPD 1. She has chronic hypertension and is taking Labetalol 100mg BID and will continue postpartum. Patient admitted for induction of labor, underwent uncomplicated vaginal delivery and had an uncomplicated postpartum course, discharged on PPD 1. She has chronic hypertension and is taking Labetalol 100mg BID and will continue postpartum. Patient also has T2DM and will continue with her insulin until follow up with endocrinologist

## 2020-04-24 NOTE — DISCHARGE NOTE OB - PATIENT PORTAL LINK FT
You can access the FollowMyHealth Patient Portal offered by [facility] by registering at the following website: [email or web address]. By joining [employer]’s FollowMyHealth portal, you will also be able to view your health information using other applications (apps) compatible with our system.

## 2020-04-24 NOTE — PROCEDURE NOTE - ADDITIONAL PROCEDURE DETAILS
Initial attempt L3-4 - encountered bone so moved one level below.     Infusion of 0.1% bupiv at 15ml/hr

## 2020-04-24 NOTE — DISCHARGE NOTE OB - CARE PLAN
Principal Discharge DX:	Vaginal delivery  Goal:	healthy recovery  Assessment and plan of treatment:	No heavy lifting.   Nothing inside the vagina for 6 weeks: no tampons, douching, sexual intercourse, tub baths or pools.   If you have a fever over 100.4F, severe abdominal pain or heavy vaginal bleeding please call your doctor or go to the emergency room.  Please follow up with your OBGYN in 6 weeks for a postpartum visit. Principal Discharge DX:	Vaginal delivery  Goal:	healthy recovery  Assessment and plan of treatment:	No heavy lifting.   Nothing inside the vagina for 6 weeks: no tampons, douching, sexual intercourse, tub baths or pools.   If you have a fever over 100.4F, severe abdominal pain or heavy vaginal bleeding please call your doctor or go to the emergency room.  Please follow up with your OBGYN in 6 weeks for a postpartum visit.  Secondary Diagnosis:	Chronic hypertension  Goal:	normal blood pressures  Assessment and plan of treatment:	Take your labetalol 100mg twice per day as your doctor.  If you have headaches, vision changes, chest pain, shortness of breath, pain in your right upper abdomen, new swelling please call your doctor and go to the emergency room.  Secondary Diagnosis:	Gestational diabetes mellitus (GDM)  Goal:	Resolution of disease  Assessment and plan of treatment:	Please complete the 75g two hour glucose tolerance test around 5 weeks postpartum as per your doctor. Principal Discharge DX:	Vaginal delivery  Goal:	healthy recovery  Assessment and plan of treatment:	No heavy lifting.   Nothing inside the vagina for 6 weeks: no tampons, douching, sexual intercourse, tub baths or pools.   If you have a fever over 100.4F, severe abdominal pain or heavy vaginal bleeding please call your doctor or go to the emergency room.  Please follow up with your OBGYN in 6 weeks for a postpartum visit.  Secondary Diagnosis:	Chronic hypertension  Goal:	normal blood pressures  Assessment and plan of treatment:	Take your labetalol 100mg twice per day as your doctor.  If you have headaches, vision changes, chest pain, shortness of breath, pain in your right upper abdomen, new swelling please call your doctor and go to the emergency room.  Secondary Diagnosis:	Type 2 diabetes mellitus without complication, unspecified whether long term insulin use  Goal:	Normal blood glucose  Assessment and plan of treatment:	Continue glucose monitoring at home. Decrease insulin regimen to half dose until follow up with endocrinologist Principal Discharge DX:	Vaginal delivery  Goal:	healthy recovery  Assessment and plan of treatment:	No heavy lifting.   Nothing inside the vagina for 6 weeks: no tampons, douching, sexual intercourse, tub baths or pools.   If you have a fever over 100.4F, severe abdominal pain or heavy vaginal bleeding please call your doctor or go to the emergency room.  Please follow up with your OBGYN in 6 weeks for a postpartum visit.  Secondary Diagnosis:	Chronic hypertension  Goal:	normal blood pressures  Assessment and plan of treatment:	Take your labetalol 100mg twice per day as your doctor. Followup in 1 week for BP check  If you have headaches, vision changes, chest pain, shortness of breath, pain in your right upper abdomen, new swelling please call your doctor and go to the emergency room.  Secondary Diagnosis:	Type 2 diabetes mellitus without complication, unspecified whether long term insulin use  Goal:	Normal blood glucose  Assessment and plan of treatment:	Continue glucose monitoring at home. Decrease insulin regimen to half dose until follow up with endocrinologist

## 2020-04-25 LAB
BASOPHILS # BLD AUTO: 0.04 K/UL — SIGNIFICANT CHANGE UP (ref 0–0.2)
BASOPHILS NFR BLD AUTO: 0.4 % — SIGNIFICANT CHANGE UP (ref 0–1)
EOSINOPHIL # BLD AUTO: 0.09 K/UL — SIGNIFICANT CHANGE UP (ref 0–0.7)
EOSINOPHIL NFR BLD AUTO: 0.8 % — SIGNIFICANT CHANGE UP (ref 0–8)
GLUCOSE BLDC GLUCOMTR-MCNC: 122 MG/DL — HIGH (ref 70–99)
HCT VFR BLD CALC: 33.2 % — LOW (ref 37–47)
HGB BLD-MCNC: 11.1 G/DL — LOW (ref 12–16)
IMM GRANULOCYTES NFR BLD AUTO: 0.3 % — SIGNIFICANT CHANGE UP (ref 0.1–0.3)
LYMPHOCYTES # BLD AUTO: 2.83 K/UL — SIGNIFICANT CHANGE UP (ref 1.2–3.4)
LYMPHOCYTES # BLD AUTO: 26.7 % — SIGNIFICANT CHANGE UP (ref 20.5–51.1)
MCHC RBC-ENTMCNC: 26.6 PG — LOW (ref 27–31)
MCHC RBC-ENTMCNC: 33.4 G/DL — SIGNIFICANT CHANGE UP (ref 32–37)
MCV RBC AUTO: 79.6 FL — LOW (ref 81–99)
MONOCYTES # BLD AUTO: 0.62 K/UL — HIGH (ref 0.1–0.6)
MONOCYTES NFR BLD AUTO: 5.9 % — SIGNIFICANT CHANGE UP (ref 1.7–9.3)
NEUTROPHILS # BLD AUTO: 6.98 K/UL — HIGH (ref 1.4–6.5)
NEUTROPHILS NFR BLD AUTO: 65.9 % — SIGNIFICANT CHANGE UP (ref 42.2–75.2)
NRBC # BLD: 0 /100 WBCS — SIGNIFICANT CHANGE UP (ref 0–0)
PLATELET # BLD AUTO: 239 K/UL — SIGNIFICANT CHANGE UP (ref 130–400)
RBC # BLD: 4.17 M/UL — LOW (ref 4.2–5.4)
RBC # FLD: 13.2 % — SIGNIFICANT CHANGE UP (ref 11.5–14.5)
WBC # BLD: 10.59 K/UL — SIGNIFICANT CHANGE UP (ref 4.8–10.8)
WBC # FLD AUTO: 10.59 K/UL — SIGNIFICANT CHANGE UP (ref 4.8–10.8)

## 2020-04-25 RX ORDER — INSULIN DETEMIR 100/ML (3)
6 INSULIN PEN (ML) SUBCUTANEOUS
Qty: 0 | Refills: 0 | DISCHARGE

## 2020-04-25 RX ORDER — INSULIN DETEMIR 100/ML (3)
16 INSULIN PEN (ML) SUBCUTANEOUS
Qty: 0 | Refills: 0 | DISCHARGE

## 2020-04-25 RX ORDER — INSULIN LISPRO 100/ML
10 VIAL (ML) SUBCUTANEOUS
Qty: 0 | Refills: 0 | DISCHARGE

## 2020-04-25 RX ADMIN — Medication 975 MILLIGRAM(S): at 08:41

## 2020-04-25 RX ADMIN — Medication 100 MILLIGRAM(S): at 08:41

## 2020-04-25 RX ADMIN — Medication 600 MILLIGRAM(S): at 06:41

## 2020-04-25 RX ADMIN — Medication 975 MILLIGRAM(S): at 02:48

## 2020-04-25 NOTE — PROGRESS NOTE ADULT - SUBJECTIVE AND OBJECTIVE BOX
Patient seen and examined, doing well. Some cramping, worsened with breastfeeding/pumping. Moderate lochia. Ambulating and voiding without difficulty.    Objective:   Vital Signs Last 24 Hrs  T(C): 36.4 (25 Apr 2020 07:46), Max: 36.4 (25 Apr 2020 07:46)  T(F): 97.5 (25 Apr 2020 07:46), Max: 97.5 (25 Apr 2020 07:46)  HR: 87 (25 Apr 2020 07:46) (79 - 92)  BP: 144/73 (25 Apr 2020 07:46) (121/71 - 148/81)  BP(mean): --  RR: 18 (25 Apr 2020 07:46) (16 - 18)  SpO2: --  Gen: NAD  Abd: Soft, Nontender, Nondistended, Fundus firm below the umbilicus  Ext: no tenderness, mild edema    Labs:                        11.1   10.59 )-----------( 239      ( 25 Apr 2020 07:20 )             33.2       Medications:  acetaminophen   Tablet .. 975 milliGRAM(s) Oral <User Schedule>  benzocaine 20%/menthol 0.5% Spray 1 Spray(s) Topical every 6 hours PRN  dibucaine 1% Ointment 1 Application(s) Topical every 6 hours PRN  diphenhydrAMINE 25 milliGRAM(s) Oral every 6 hours PRN  glycerin Suppository - Adult 1 Suppository(s) Rectal at bedtime PRN  hydrocortisone 1% Cream 1 Application(s) Topical every 6 hours PRN  ibuprofen  Tablet. 600 milliGRAM(s) Oral every 6 hours  labetalol 100 milliGRAM(s) Oral every 12 hours  lanolin Ointment 1 Application(s) Topical every 6 hours PRN  magnesium hydroxide Suspension 30 milliLiter(s) Oral two times a day PRN  measles/mumps/rubella Vaccine 0.5 milliLiter(s) SubCutaneous once  measles/mumps/rubella Vaccine 0.5 milliLiter(s) SubCutaneous once  oxyCODONE    IR 5 milliGRAM(s) Oral every 3 hours PRN  oxyCODONE    IR 5 milliGRAM(s) Oral once PRN  oxytocin Infusion 333.333 milliUNIT(s)/Min IV Continuous <Continuous>  pramoxine 1%/zinc 5% Cream 1 Application(s) Topical every 4 hours PRN  prenatal multivitamin 1 Tablet(s) Oral daily  simethicone 80 milliGRAM(s) Chew every 4 hours PRN  sodium chloride 0.9% lock flush 3 milliLiter(s) IV Push every 8 hours  witch hazel Pads 1 Application(s) Topical every 4 hours PRN

## 2020-04-25 NOTE — CHART NOTE - NSCHARTNOTEFT_GEN_A_CORE
UDS repeated for fentanyl result positive at request of mother. No known hx of Fentanyl or IVDA.     UDS specimen today to be collected 4/25, after Epidural placement on 4/24/2020.   If opiates result positive, in light of hx of negative previous UDS, this would be likely secondary to epidural anesthesia.

## 2020-04-25 NOTE — PROGRESS NOTE ADULT - ASSESSMENT
34y s/p , PPD #1, doing well.    Chronic HTN  - BP range 121/71 - 148/81  - Continue Labetalol 100mg BID    T2DM  - Continue monitoring blood glucose  - Continue insulin regimen with half dose until follow up with endocrinologist    Postpartum  - Routine postpartum care  - Encourage breastfeeding ambulation and PO hydration  - Discharge home today, instructions discussed

## 2020-04-26 ENCOUNTER — OUTPATIENT (OUTPATIENT)
Dept: EMERGENCY DEPT | Facility: HOSPITAL | Age: 35
LOS: 1 days | Discharge: HOME | End: 2020-04-26
Payer: COMMERCIAL

## 2020-04-26 VITALS — DIASTOLIC BLOOD PRESSURE: 75 MMHG | SYSTOLIC BLOOD PRESSURE: 139 MMHG | HEART RATE: 96 BPM

## 2020-04-26 VITALS — DIASTOLIC BLOOD PRESSURE: 84 MMHG | SYSTOLIC BLOOD PRESSURE: 167 MMHG | HEART RATE: 83 BPM

## 2020-04-26 VITALS
OXYGEN SATURATION: 98 % | WEIGHT: 255.07 LBS | HEIGHT: 70 IN | SYSTOLIC BLOOD PRESSURE: 173 MMHG | HEART RATE: 94 BPM | DIASTOLIC BLOOD PRESSURE: 81 MMHG | RESPIRATION RATE: 18 BRPM | TEMPERATURE: 99 F

## 2020-04-26 PROBLEM — O24.419 GESTATIONAL DIABETES MELLITUS IN PREGNANCY, UNSPECIFIED CONTROL: Chronic | Status: ACTIVE | Noted: 2020-04-23

## 2020-04-26 PROBLEM — I10 ESSENTIAL (PRIMARY) HYPERTENSION: Chronic | Status: ACTIVE | Noted: 2020-04-23

## 2020-04-26 LAB
ALBUMIN SERPL ELPH-MCNC: 3.5 G/DL — SIGNIFICANT CHANGE UP (ref 3.5–5.2)
ALP SERPL-CCNC: 72 U/L — SIGNIFICANT CHANGE UP (ref 30–115)
ALT FLD-CCNC: 12 U/L — SIGNIFICANT CHANGE UP (ref 0–41)
ANION GAP SERPL CALC-SCNC: 13 MMOL/L — SIGNIFICANT CHANGE UP (ref 7–14)
APPEARANCE UR: CLEAR — SIGNIFICANT CHANGE UP
AST SERPL-CCNC: 15 U/L — SIGNIFICANT CHANGE UP (ref 0–41)
BACTERIA # UR AUTO: NEGATIVE — SIGNIFICANT CHANGE UP
BASOPHILS # BLD AUTO: 0.03 K/UL — SIGNIFICANT CHANGE UP (ref 0–0.2)
BASOPHILS NFR BLD AUTO: 0.3 % — SIGNIFICANT CHANGE UP (ref 0–1)
BILIRUB SERPL-MCNC: 0.2 MG/DL — SIGNIFICANT CHANGE UP (ref 0.2–1.2)
BILIRUB UR-MCNC: NEGATIVE — SIGNIFICANT CHANGE UP
BUN SERPL-MCNC: 5 MG/DL — LOW (ref 10–20)
CALCIUM SERPL-MCNC: 9 MG/DL — SIGNIFICANT CHANGE UP (ref 8.5–10.1)
CHLORIDE SERPL-SCNC: 103 MMOL/L — SIGNIFICANT CHANGE UP (ref 98–110)
CO2 SERPL-SCNC: 21 MMOL/L — SIGNIFICANT CHANGE UP (ref 17–32)
COLOR SPEC: SIGNIFICANT CHANGE UP
CREAT ?TM UR-MCNC: 35 MG/DL — SIGNIFICANT CHANGE UP
CREAT SERPL-MCNC: 0.5 MG/DL — LOW (ref 0.7–1.5)
DIFF PNL FLD: ABNORMAL
EOSINOPHIL # BLD AUTO: 0.12 K/UL — SIGNIFICANT CHANGE UP (ref 0–0.7)
EOSINOPHIL NFR BLD AUTO: 1.1 % — SIGNIFICANT CHANGE UP (ref 0–8)
EPI CELLS # UR: 3 /HPF — SIGNIFICANT CHANGE UP (ref 0–5)
GLUCOSE SERPL-MCNC: 144 MG/DL — HIGH (ref 70–99)
GLUCOSE UR QL: NEGATIVE — SIGNIFICANT CHANGE UP
HCT VFR BLD CALC: 33.4 % — LOW (ref 37–47)
HGB BLD-MCNC: 11.6 G/DL — LOW (ref 12–16)
HYALINE CASTS # UR AUTO: 1 /LPF — SIGNIFICANT CHANGE UP (ref 0–7)
IMM GRANULOCYTES NFR BLD AUTO: 0.5 % — HIGH (ref 0.1–0.3)
KETONES UR-MCNC: SIGNIFICANT CHANGE UP
LDH SERPL L TO P-CCNC: 171 — SIGNIFICANT CHANGE UP (ref 50–242)
LEUKOCYTE ESTERASE UR-ACNC: ABNORMAL
LYMPHOCYTES # BLD AUTO: 2.53 K/UL — SIGNIFICANT CHANGE UP (ref 1.2–3.4)
LYMPHOCYTES # BLD AUTO: 23.6 % — SIGNIFICANT CHANGE UP (ref 20.5–51.1)
MAGNESIUM SERPL-MCNC: 1.7 MG/DL — LOW (ref 1.8–2.4)
MCHC RBC-ENTMCNC: 27.4 PG — SIGNIFICANT CHANGE UP (ref 27–31)
MCHC RBC-ENTMCNC: 34.7 G/DL — SIGNIFICANT CHANGE UP (ref 32–37)
MCV RBC AUTO: 79 FL — LOW (ref 81–99)
MONOCYTES # BLD AUTO: 0.51 K/UL — SIGNIFICANT CHANGE UP (ref 0.1–0.6)
MONOCYTES NFR BLD AUTO: 4.8 % — SIGNIFICANT CHANGE UP (ref 1.7–9.3)
NEUTROPHILS # BLD AUTO: 7.48 K/UL — HIGH (ref 1.4–6.5)
NEUTROPHILS NFR BLD AUTO: 69.7 % — SIGNIFICANT CHANGE UP (ref 42.2–75.2)
NITRITE UR-MCNC: NEGATIVE — SIGNIFICANT CHANGE UP
NRBC # BLD: 0 /100 WBCS — SIGNIFICANT CHANGE UP (ref 0–0)
PH UR: 7 — SIGNIFICANT CHANGE UP (ref 5–8)
PLATELET # BLD AUTO: 263 K/UL — SIGNIFICANT CHANGE UP (ref 130–400)
POTASSIUM SERPL-MCNC: 4.4 MMOL/L — SIGNIFICANT CHANGE UP (ref 3.5–5)
POTASSIUM SERPL-SCNC: 4.4 MMOL/L — SIGNIFICANT CHANGE UP (ref 3.5–5)
PROT ?TM UR-MCNC: 17 MG/DLG/24H — SIGNIFICANT CHANGE UP
PROT SERPL-MCNC: 6.3 G/DL — SIGNIFICANT CHANGE UP (ref 6–8)
PROT UR-MCNC: SIGNIFICANT CHANGE UP
PROT/CREAT UR-RTO: 0.5 RATIO — HIGH (ref 0–0.2)
RBC # BLD: 4.23 M/UL — SIGNIFICANT CHANGE UP (ref 4.2–5.4)
RBC # FLD: 13 % — SIGNIFICANT CHANGE UP (ref 11.5–14.5)
RBC CASTS # UR COMP ASSIST: 118 /HPF — HIGH (ref 0–4)
SODIUM SERPL-SCNC: 137 MMOL/L — SIGNIFICANT CHANGE UP (ref 135–146)
SP GR SPEC: 1.01 — SIGNIFICANT CHANGE UP (ref 1.01–1.02)
URATE SERPL-MCNC: 4.5 MG/DL — SIGNIFICANT CHANGE UP (ref 2.5–7)
UROBILINOGEN FLD QL: SIGNIFICANT CHANGE UP
WBC # BLD: 10.72 K/UL — SIGNIFICANT CHANGE UP (ref 4.8–10.8)
WBC # FLD AUTO: 10.72 K/UL — SIGNIFICANT CHANGE UP (ref 4.8–10.8)
WBC UR QL: 13 /HPF — HIGH (ref 0–5)

## 2020-04-26 PROCEDURE — 99285 EMERGENCY DEPT VISIT HI MDM: CPT

## 2020-04-26 PROCEDURE — 93010 ELECTROCARDIOGRAM REPORT: CPT

## 2020-04-26 PROCEDURE — 99234 HOSP IP/OBS SM DT SF/LOW 45: CPT

## 2020-04-26 RX ORDER — NIFEDIPINE 30 MG
10 TABLET, EXTENDED RELEASE 24 HR ORAL ONCE
Refills: 0 | Status: COMPLETED | OUTPATIENT
Start: 2020-04-26 | End: 2020-04-26

## 2020-04-26 RX ORDER — LABETALOL HCL 100 MG
200 TABLET ORAL EVERY 12 HOURS
Refills: 0 | Status: DISCONTINUED | OUTPATIENT
Start: 2020-04-26 | End: 2020-05-11

## 2020-04-26 RX ORDER — NIFEDIPINE 30 MG
1 TABLET, EXTENDED RELEASE 24 HR ORAL
Qty: 30 | Refills: 0
Start: 2020-04-26 | End: 2020-05-25

## 2020-04-26 RX ORDER — ACETAMINOPHEN 500 MG
650 TABLET ORAL ONCE
Refills: 0 | Status: COMPLETED | OUTPATIENT
Start: 2020-04-26 | End: 2020-04-26

## 2020-04-26 RX ADMIN — Medication 10 MILLIGRAM(S): at 08:35

## 2020-04-26 RX ADMIN — Medication 200 MILLIGRAM(S): at 06:25

## 2020-04-26 RX ADMIN — Medication 10 MILLIGRAM(S): at 09:49

## 2020-04-26 RX ADMIN — Medication 650 MILLIGRAM(S): at 07:26

## 2020-04-26 NOTE — ED ADULT TRIAGE NOTE - CHIEF COMPLAINT QUOTE
I just had a baby on Thursday, I was discharged today. My blood pressure is really high - patient I just had a baby on Thursday, I was discharged today. My blood pressure is really high - patient    Patient took extra dose of Labetalol @ 2 AM

## 2020-04-26 NOTE — ED PROVIDER NOTE - CLINICAL SUMMARY MEDICAL DECISION MAKING FREE TEXT BOX
33yo F postpartum day 2 presents for eval of elevated BP, transferred to L&D for further monitoring.

## 2020-04-26 NOTE — OB PROVIDER TRIAGE NOTE - HISTORY OF PRESENT ILLNESS
33 yo  s/p vaginal delivery on , now postpartum day 2, h/o T2DM (incorrectly documented previously as GDMA2) & cHTN, presents for elevated BP at home. She was feeling "funny" last night so she took her blood pressure several times which was 160s/90s. She was on labetalol 200mg BID during pregnancy and was changed to labetalol 100mg BID after delivery. She took an extra 200mg of labetalol at 2000 and then and extra 100mg at 0200. She is an anxious person and always worries about her blood pressures and her glucose. She has been using her continuous glucose monitor which has been in the 130s all night. She has mild irregular cramping, minimally painful, improving since delivery. She had minimal lochia.  She denies dizziness, weakness, fevers, chills, shortness of breath, chest pain, nausea, vomiting, dysuria, hematuria, diarrhea, or constipation.

## 2020-04-26 NOTE — OB PROVIDER TRIAGE NOTE - ADDITIONAL INSTRUCTIONS
- take procardia 30xl at bedtime, rx sent to pharmacy  - if experiencing headache, blurry vision, chest pain, shortness of breath, upper abdominal pain, lower extremity swelling or BP >160/110, please call your doctor or come to L&D for evaluation

## 2020-04-26 NOTE — OB PROVIDER TRIAGE NOTE - NSOBPROVIDERNOTE_OBGYN_ALL_OB_FT
33 yo  s/p vaginal delivery on , now postpartum day 2, h/o T2DM (incorrectly documented previously as GDMA2) & cHTN, with poorly controlled chronic hypertension  The patient had several severe rang BPs (160s systolic) on arrival. Given the normal exam and labwork, we do not believe that this patient has super-imposed pre-eclampsia and therefore does not require IV pushes of anti-hypertensives. Will continue to monitor and treat with oral antihypertensives.    - Transfer to high risk room  - Labetalol 200mg BID  - Monitor vitals  - Pain management PRN    Discussed with Dr. Olvera

## 2020-04-26 NOTE — ED ADULT NURSE NOTE - OBJECTIVE STATEMENT
pt sts she had a baby 2 days ago and today when she checked her blood pressure it was high and it wasn't going down

## 2020-04-26 NOTE — OB PROVIDER TRIAGE NOTE - NS_OBGYNHISTORY_OBGYN_ALL_OB_FT
Ob: ,   at 36+ weeks; cHTN and GDMA2 on insulin; 5bj58lm (per patient- normal GTT postpartum), 37 week vaginal delivery complicated by cHTN on labetalol 200mg BID and T2DM on insulin  SABx1    Gyn: h/o PCOS; h/o HPV years ago s/p normal colposcopy; denies h/o fibroids or STIs

## 2020-04-26 NOTE — OB PROVIDER TRIAGE NOTE - NSHPPHYSICALEXAM_GEN_ALL_CORE
Vital Signs Last 24 Hrs  T(F): 98.1 (26 Apr 2020 05:44), Max: 99.1 (26 Apr 2020 04:33)  HR: 72 (26 Apr 2020 06:51) (72 - 94)  BP: 152/83 (26 Apr 2020 06:51) (144/73 - 175/84)  RR: 18 (26 Apr 2020 05:44) (18 - 18)    Fingerstick 134    Gen: AAOx3, NAD  Chest: S1S2, RRR, lungs CTA BL  Abd: Soft, nontender, nondistended, BS+

## 2020-04-26 NOTE — ED ADULT NURSE NOTE - CHIEF COMPLAINT QUOTE
I just had a baby on Thursday, I was discharged today. My blood pressure is really high - patient    Patient took extra dose of Labetalol @ 2 AM

## 2020-04-26 NOTE — ED PROVIDER NOTE - ATTENDING CONTRIBUTION TO CARE
35yo F postpartum day 2 presents for eval of elevated BP, systolic 170s at home. Denies symptoms. Taking labetalol 100mgbid. Exam as above. Stable to go to L&D for further monitoring by ObGyn team.

## 2020-04-26 NOTE — PROGRESS NOTE ADULT - SUBJECTIVE AND OBJECTIVE BOX
PGY 4 note    Pt with continued elevated BPs in the severe range.  Continues to feel anxious otherwise no symptoms.  Normal physical exam.  PMD contacted, discussed addition of procardia to antihypertensive regimen.  Will give procardia 10mg IR now and observe BPs.  Will likely start procardia 30XL.    Dr Olvera aware

## 2020-04-26 NOTE — OB PROVIDER TRIAGE NOTE - NSHPLABSRESULTS_GEN_ALL_CORE
COVID negative                          11.6   10.72 )-----------( 263      ( 26 Apr 2020 04:48 )             33.4     04-26    137  |  103  |  5<L>  ----------------------------<  144<H>  4.4   |  21  |  0.5<L>    Ca    9.0      26 Apr 2020 04:48  Mg     1.7     04-26    TPro  6.3  /  Alb  3.5  /  TBili  0.2  /  DBili  x   /  AST  15  /  ALT  12  /  AlkPhos  72  04-26    Uric Acid, Serum (04.26.20 @ 04:48)    Uric Acid, Serum: 4.5 mg/dL    Lactate Dehydrogenase, Serum (04.26.20 @ 04:48)    Lactate Dehydrogenase, Serum: 171

## 2020-04-26 NOTE — ED ADULT NURSE NOTE - NSIMPLEMENTINTERV_GEN_ALL_ED
Implemented All Universal Safety Interventions:  [place] to call system. Call bell, personal items and telephone within reach. Instruct patient to call for assistance. Room bathroom lighting operational. Non-slip footwear when patient is off stretcher. Physically safe environment: no spills, clutter or unnecessary equipment. Stretcher in lowest position, wheels locked, appropriate side rails in place.

## 2020-04-26 NOTE — ED PROVIDER NOTE - OBJECTIVE STATEMENT
33 y/o female with PMH of cHTN on labetalol 100 mg BID, DM  s/p vaginal delivery on  who presents to ED for elevated blood pressure. Pt states she checked her blood pressure this evening, noted it to be 170's systolic then 150 prompting pt come to Ed for evaluation. Pt denies chest pain, SOB, headache, vision change, nausea or vomiting.    ob/gyn: Dr. Velez

## 2020-04-28 DIAGNOSIS — Z3A.37 37 WEEKS GESTATION OF PREGNANCY: ICD-10-CM

## 2020-04-28 DIAGNOSIS — Z34.80 ENCOUNTER FOR SUPERVISION OF OTHER NORMAL PREGNANCY, UNSPECIFIED TRIMESTER: ICD-10-CM

## 2020-04-29 LAB
AMPHET UR-MCNC: NEGATIVE — SIGNIFICANT CHANGE UP
BARBITURATES UR SCN-MCNC: NEGATIVE — SIGNIFICANT CHANGE UP
BENZODIAZ UR-MCNC: NEGATIVE — SIGNIFICANT CHANGE UP
BUPRENORPHINE SCREEN, URINE RESULT: NEGATIVE — SIGNIFICANT CHANGE UP
COCAINE METAB.OTHER UR-MCNC: NEGATIVE — SIGNIFICANT CHANGE UP
FENTANYL UR QL: NEGATIVE — SIGNIFICANT CHANGE UP
L&D DRUG SCREEN, URINE: SIGNIFICANT CHANGE UP
METHADONE UR-MCNC: NEGATIVE — SIGNIFICANT CHANGE UP
OPIATES UR-MCNC: NEGATIVE — SIGNIFICANT CHANGE UP
OXYCODONE UR-MCNC: NEGATIVE — SIGNIFICANT CHANGE UP
PCP UR-MCNC: NEGATIVE — SIGNIFICANT CHANGE UP
PROPOXYPHENE QUALITATIVE URINE RESULT: NEGATIVE — SIGNIFICANT CHANGE UP

## 2020-05-01 ENCOUNTER — APPOINTMENT (OUTPATIENT)
Dept: ANTEPARTUM | Facility: CLINIC | Age: 35
End: 2020-05-01

## 2020-05-06 DIAGNOSIS — Z88.0 ALLERGY STATUS TO PENICILLIN: ICD-10-CM

## 2020-05-08 ENCOUNTER — APPOINTMENT (OUTPATIENT)
Dept: ANTEPARTUM | Facility: CLINIC | Age: 35
End: 2020-05-08

## 2020-05-15 ENCOUNTER — APPOINTMENT (OUTPATIENT)
Dept: ANTEPARTUM | Facility: CLINIC | Age: 35
End: 2020-05-15

## 2020-06-08 ENCOUNTER — APPOINTMENT (OUTPATIENT)
Dept: OBGYN | Facility: CLINIC | Age: 35
End: 2020-06-08
Payer: COMMERCIAL

## 2020-06-08 VITALS — BODY MASS INDEX: 37.93 KG/M2 | WEIGHT: 236 LBS | HEIGHT: 66 IN

## 2020-06-08 DIAGNOSIS — Z34.90 ENCOUNTER FOR SUPERVISION OF NORMAL PREGNANCY, UNSPECIFIED, UNSPECIFIED TRIMESTER: ICD-10-CM

## 2020-06-08 PROCEDURE — 0502F SUBSEQUENT PRENATAL CARE: CPT

## 2020-10-13 ENCOUNTER — APPOINTMENT (OUTPATIENT)
Dept: OBGYN | Facility: CLINIC | Age: 35
End: 2020-10-13

## 2020-12-23 PROBLEM — J06.9 ACUTE UPPER RESPIRATORY INFECTION: Status: RESOLVED | Noted: 2020-03-06 | Resolved: 2020-12-23

## 2021-10-06 PROBLEM — I10 ESSENTIAL HYPERTENSION: Status: ACTIVE | Noted: 2018-02-07

## 2022-01-24 ENCOUNTER — TRANSCRIPTION ENCOUNTER (OUTPATIENT)
Age: 37
End: 2022-01-24

## 2022-06-02 NOTE — OB RN PATIENT PROFILE - PRO HIV INFANT
Plastic Surgeon Procedure Text (A): After obtaining clear surgical margins the patient was sent to plastics for surgical repair.  The patient understands they will receive post-surgical care and follow-up from the referring physician's office. negative

## 2022-06-06 NOTE — OB RN PATIENT PROFILE - HAS THE PATIENT RECEIVED THE INFLUENZA VACCINE THIS SEASON?
Virtual Regular Visit    Verification of patient location:    Patient is located in the following state in which I hold an active license [place]      Assessment/Plan:    Problem List Items Addressed This Visit        Respiratory    Lower respiratory infection - Primary     Advised abx and steroids given concerns for worsening respiratory infection; home COVID19 was negative and no known exposures; if no improvement with treatment in the next 48-72 hours advised recheck or CXR; f/u guidance given should sx worsen or not improve            Relevant Medications    predniSONE 10 mg tablet    albuterol (Ventolin HFA) 90 mcg/act inhaler    doxycycline hyclate (VIBRAMYCIN) 100 mg capsule    benzonatate (TESSALON PERLES) 100 mg capsule               Reason for visit is   Chief Complaint   Patient presents with    Virtual Regular Visit        Encounter provider Karen Sanz DO    Provider located at [address] [place] [address] [address] [address]  [phone]      Recent Visits  No visits were found meeting these conditions  Showing recent visits within past 7 days and meeting all other requirements  Today's Visits  Date Type Provider Dept   06/06/22 Telemedicine Karen Sanz DO [place]g [id number] [facility] today's visits and meeting all other requirements  Future Appointments  No visits were found meeting these conditions  Showing future appointments within next 150 days and meeting all other requirements       The patient was identified by name and date of birth  Martha Harpreetaditi was informed that this is a telemedicine visit and that the visit is being conducted through [address] and patient was informed this is a secure, HIPAA-complaint platform  She agrees to proceed     My office door was closed  No one else was in the room  She acknowledged consent and understanding of privacy and security of the video platform   The patient has agreed to participate and understands they can discontinue the visit at any time  Patient is aware this is a billable service  Subjective  Regan Garcia is a 31 y o  female productive cough   Upper Respiratory Infection  Patient complains of symptoms of a URI, possible sinusitis  Symptoms include bilateral ear pressure/pain, congestion, cough described as productive, nasal congestion, post nasal drip, sinus pressure and sore throat and chest tightness  Onset of symptoms was 1 week ago, and has been gradually worsening since that time  Treatment to date: none  Took COVID19 test at home and was negative  No known COVID19 exposures  Feels more in chest to try and loosen up the mucus  No fevers  Patient states symptoms started as URI and got better but then rapidly worsened and feels it settled in chest  Has had bronchitis in past and feels similar  Cough is thick yellow mucus  Past Medical History:   Diagnosis Date    Anxiety     Depression     Pilonidal cyst with abscess     Seasonal allergies     Varicella        Past Surgical History:   Procedure Laterality Date    CHOLECYSTECTOMY  08/2010    Laparoscopic    COLONOSCOPY N/A 5/9/2018    Procedure: COLONOSCOPY;  Surgeon: José Miguel Thorne MD;  Location: [facility] GI LAB;   Service: Gastroenterology    HI LAP,RMV  ADNEXAL STRUCTURE Bilateral 10/27/2016    Procedure: SALPINGECTOMY;  Surgeon: Pravin Nettles MD;  Location: [facility] OR;  Service: Gynecology    HI 6410 Scrap Connection N/A 4/28/2016    Procedure: EXCISION PILONIDAL CYST;  Surgeon: Tonia Arreola MD;  Location: [facility] OR;  Service: General    SALPINGECTOMY      WISDOM TOOTH EXTRACTION  01/2016    right upper only       Current Outpatient Medications   Medication Sig Dispense Refill    albuterol (Ventolin HFA) 90 mcg/act inhaler Inhale 2 puffs every 6 (six) hours as needed for wheezing 18 g 0    benzonatate (TESSALON PERLES) 100 mg capsule Take 1 capsule (100 mg total) by mouth 3 (three) times a day as needed for cough 20 capsule 0    doxycycline hyclate (VIBRAMYCIN) 100 mg capsule Take 1 capsule (100 mg total) by mouth every 12 (twelve) hours for 10 days 20 capsule 0    predniSONE 10 mg tablet Take 5 tabs PO daily x 2 days; take 4 tabs QD x 2 days; take 3 tabs QD x 2 days; take 2 tabs QD x 2 days; take 1 tab QD x 2 days 30 tablet 0    ARIPiprazole (ABILIFY) 5 mg tablet Take 1 tablet (5 mg total) by mouth daily at bedtime 30 tablet 1    citalopram (CeleXA) 20 mg tablet take 1 and 1/2 tablets by mouth daily 45 tablet 0    hydrOXYzine HCL (ATARAX) 25 mg tablet       Multiple Vitamin (MULTIVITAMIN) tablet Take 1 tablet by mouth daily   prazosin (MINIPRESS) 2 mg capsule       prazosin (MINIPRESS) 5 mg capsule        No current facility-administered medications for this visit  Allergies   Allergen Reactions    Gluten Meal - Food Allergy        Review of Systems   Constitutional: Positive for fatigue  HENT: Positive for congestion, ear pain, sinus pressure, sinus pain and sore throat  Respiratory: Positive for cough, chest tightness, shortness of breath and wheezing  Video Exam    There were no vitals filed for this visit  Physical Exam  Constitutional:       General: She is not in acute distress  Appearance: Normal appearance  She is not ill-appearing or toxic-appearing  HENT:      Head: Normocephalic and atraumatic  Pulmonary:      Effort: Pulmonary effort is normal    Neurological:      General: No focal deficit present  Mental Status: She is alert and oriented to person, place, and time  Psychiatric:         Mood and Affect: Mood normal          Behavior: Behavior normal          Thought Content: Thought content normal          Judgment: Judgment normal           I spent 10 minutes directly with the patient during this visit    [address] [place] [address],  [id number] verbally agrees to participate in [employer]   Pt is aware that Virtual Care Services could be limited without vital signs or the ability to perform a full hands-on physical Andreas Carr understands she or the provider may request at any time to terminate the video visit and request the patient to seek care or treatment in person  no...

## 2022-07-26 ENCOUNTER — NON-APPOINTMENT (OUTPATIENT)
Age: 37
End: 2022-07-26

## 2022-10-27 NOTE — OB RN PATIENT PROFILE - DURING SKIN TO SKIN, COUNSELING AND EDUCATION ON THE BENEFITS OF EXCLUSIVELY BREASTFEEDING IS REINFORCED.
Statement Selected Picato Counseling:  I discussed with the patient the risks of Picato including but not limited to erythema, scaling, itching, weeping, crusting, and pain.

## 2024-01-12 ENCOUNTER — APPOINTMENT (OUTPATIENT)
Dept: PEDIATRIC ALLERGY IMMUNOLOGY | Facility: CLINIC | Age: 39
End: 2024-01-12

## 2024-04-18 ENCOUNTER — APPOINTMENT (OUTPATIENT)
Dept: OBGYN | Facility: CLINIC | Age: 39
End: 2024-04-18

## 2025-05-18 ENCOUNTER — NON-APPOINTMENT (OUTPATIENT)
Age: 40
End: 2025-05-18

## 2025-07-12 ENCOUNTER — NON-APPOINTMENT (OUTPATIENT)
Age: 40
End: 2025-07-12

## 2025-07-25 NOTE — OB RN DELIVERY SUMMARY - BABY A: APGAR 1 MIN SCORE, DELIVERY
[Time Spent: ___ minutes] : I have spent [unfilled] minutes of time on the encounter which excludes teaching and separately reported services. 9